# Patient Record
Sex: FEMALE | Race: WHITE | URBAN - METROPOLITAN AREA
[De-identification: names, ages, dates, MRNs, and addresses within clinical notes are randomized per-mention and may not be internally consistent; named-entity substitution may affect disease eponyms.]

---

## 2022-09-13 ENCOUNTER — HOSPITAL ENCOUNTER (INPATIENT)
Age: 87
LOS: 9 days | Discharge: HOME | DRG: 949 | End: 2022-09-22
Attending: PHYSICAL MEDICINE & REHABILITATION | Admitting: PHYSICAL MEDICINE & REHABILITATION
Payer: MEDICARE

## 2022-09-13 PROBLEM — K56.609 SBO (SMALL BOWEL OBSTRUCTION) (HCC): Status: ACTIVE | Noted: 2022-09-13

## 2022-09-13 PROCEDURE — 97116 GAIT TRAINING THERAPY: CPT

## 2022-09-13 PROCEDURE — 1280000000 HC REHAB R&B

## 2022-09-13 PROCEDURE — 6370000000 HC RX 637 (ALT 250 FOR IP): Performed by: PHYSICAL MEDICINE & REHABILITATION

## 2022-09-13 PROCEDURE — 97166 OT EVAL MOD COMPLEX 45 MIN: CPT

## 2022-09-13 PROCEDURE — 97162 PT EVAL MOD COMPLEX 30 MIN: CPT

## 2022-09-13 PROCEDURE — 97530 THERAPEUTIC ACTIVITIES: CPT

## 2022-09-13 PROCEDURE — 97110 THERAPEUTIC EXERCISES: CPT

## 2022-09-13 PROCEDURE — 97535 SELF CARE MNGMENT TRAINING: CPT

## 2022-09-13 PROCEDURE — 94760 N-INVAS EAR/PLS OXIMETRY 1: CPT

## 2022-09-13 PROCEDURE — 6360000002 HC RX W HCPCS: Performed by: PHYSICAL MEDICINE & REHABILITATION

## 2022-09-13 RX ORDER — MULTIVITAMIN WITH IRON
1 TABLET ORAL DAILY
Status: DISCONTINUED | OUTPATIENT
Start: 2022-09-14 | End: 2022-09-22 | Stop reason: HOSPADM

## 2022-09-13 RX ORDER — BISACODYL 10 MG
10 SUPPOSITORY, RECTAL RECTAL DAILY PRN
Status: DISCONTINUED | OUTPATIENT
Start: 2022-09-13 | End: 2022-09-22 | Stop reason: HOSPADM

## 2022-09-13 RX ORDER — HEPARIN SODIUM 5000 [USP'U]/ML
5000 INJECTION, SOLUTION INTRAVENOUS; SUBCUTANEOUS EVERY 8 HOURS SCHEDULED
Status: DISCONTINUED | OUTPATIENT
Start: 2022-09-13 | End: 2022-09-22 | Stop reason: HOSPADM

## 2022-09-13 RX ORDER — ACETAMINOPHEN 325 MG/1
975 TABLET ORAL EVERY 8 HOURS PRN
COMMUNITY

## 2022-09-13 RX ORDER — POLYETHYLENE GLYCOL 3350 17 G/17G
17 POWDER, FOR SOLUTION ORAL DAILY
COMMUNITY

## 2022-09-13 RX ORDER — LEVOTHYROXINE SODIUM 0.1 MG/1
100 TABLET ORAL DAILY
Status: DISCONTINUED | OUTPATIENT
Start: 2022-09-14 | End: 2022-09-22 | Stop reason: HOSPADM

## 2022-09-13 RX ORDER — TRAMADOL HYDROCHLORIDE 50 MG/1
100 TABLET ORAL EVERY 4 HOURS PRN
Status: DISCONTINUED | OUTPATIENT
Start: 2022-09-13 | End: 2022-09-22 | Stop reason: HOSPADM

## 2022-09-13 RX ORDER — TRAMADOL HYDROCHLORIDE 50 MG/1
25-50 TABLET ORAL EVERY 6 HOURS PRN
Status: ON HOLD | COMMUNITY
End: 2022-09-21 | Stop reason: HOSPADM

## 2022-09-13 RX ORDER — FLUTICASONE PROPIONATE 50 MCG
1 SPRAY, SUSPENSION (ML) NASAL DAILY
Status: DISCONTINUED | OUTPATIENT
Start: 2022-09-13 | End: 2022-09-22 | Stop reason: HOSPADM

## 2022-09-13 RX ORDER — BIOTIN 2500 MCG
CAPSULE ORAL
COMMUNITY

## 2022-09-13 RX ORDER — LEVOTHYROXINE SODIUM 0.1 MG/1
50 TABLET ORAL
COMMUNITY

## 2022-09-13 RX ORDER — SENNA AND DOCUSATE SODIUM 50; 8.6 MG/1; MG/1
2 TABLET, FILM COATED ORAL 2 TIMES DAILY
Status: DISCONTINUED | OUTPATIENT
Start: 2022-09-13 | End: 2022-09-20

## 2022-09-13 RX ORDER — ONDANSETRON 4 MG/1
4 TABLET, FILM COATED ORAL EVERY 8 HOURS PRN
Status: DISCONTINUED | OUTPATIENT
Start: 2022-09-13 | End: 2022-09-22 | Stop reason: HOSPADM

## 2022-09-13 RX ORDER — HEPARIN SODIUM 5000 [USP'U]/ML
5000 INJECTION, SOLUTION INTRAVENOUS; SUBCUTANEOUS 2 TIMES DAILY
Status: ON HOLD | COMMUNITY
End: 2022-09-21 | Stop reason: HOSPADM

## 2022-09-13 RX ORDER — LATANOPROST 50 UG/ML
1 SOLUTION/ DROPS OPHTHALMIC NIGHTLY
COMMUNITY

## 2022-09-13 RX ORDER — TAMSULOSIN HYDROCHLORIDE 0.4 MG/1
0.4 CAPSULE ORAL DAILY
COMMUNITY

## 2022-09-13 RX ORDER — LATANOPROST 50 UG/ML
1 SOLUTION/ DROPS OPHTHALMIC NIGHTLY
Status: DISCONTINUED | OUTPATIENT
Start: 2022-09-13 | End: 2022-09-15

## 2022-09-13 RX ORDER — FLUTICASONE PROPIONATE 50 MCG
1 SPRAY, SUSPENSION (ML) NASAL PRN
COMMUNITY

## 2022-09-13 RX ORDER — TRAMADOL HYDROCHLORIDE 50 MG/1
50 TABLET ORAL EVERY 4 HOURS PRN
Status: DISCONTINUED | OUTPATIENT
Start: 2022-09-13 | End: 2022-09-22 | Stop reason: HOSPADM

## 2022-09-13 RX ORDER — ACETAMINOPHEN 325 MG/1
650 TABLET ORAL EVERY 4 HOURS PRN
Status: DISCONTINUED | OUTPATIENT
Start: 2022-09-13 | End: 2022-09-22 | Stop reason: HOSPADM

## 2022-09-13 RX ORDER — POLYETHYLENE GLYCOL 3350 17 G/17G
17 POWDER, FOR SOLUTION ORAL DAILY
Status: DISCONTINUED | OUTPATIENT
Start: 2022-09-14 | End: 2022-09-20

## 2022-09-13 RX ORDER — TAMSULOSIN HYDROCHLORIDE 0.4 MG/1
0.4 CAPSULE ORAL DAILY
Status: DISCONTINUED | OUTPATIENT
Start: 2022-09-13 | End: 2022-09-22 | Stop reason: HOSPADM

## 2022-09-13 RX ORDER — VITAMIN E 268 MG
CAPSULE ORAL
COMMUNITY

## 2022-09-13 RX ADMIN — Medication 1 TABLET: at 21:35

## 2022-09-13 RX ADMIN — TAMSULOSIN HYDROCHLORIDE 0.4 MG: 0.4 CAPSULE ORAL at 21:34

## 2022-09-13 RX ADMIN — HEPARIN SODIUM 5000 UNITS: 5000 INJECTION INTRAVENOUS; SUBCUTANEOUS at 21:35

## 2022-09-13 RX ADMIN — LATANOPROST 1 DROP: 50 SOLUTION OPHTHALMIC at 21:35

## 2022-09-13 RX ADMIN — SENNOSIDES AND DOCUSATE SODIUM 2 TABLET: 50; 8.6 TABLET ORAL at 21:34

## 2022-09-13 NOTE — PLAN OF CARE
ARU PATIENT TREATMENT PLAN  [address]  [phone]    Pj Sanabria    : 1/10/1928  Acct #: [de-identified]  MRN: [id number]   PHYSICIAN:  Bindu Rojo MD  Primary Problem    Patient Active Problem List   Diagnosis    SBO (small bowel obstruction) [facility])       Rehabilitation Diagnosis:     SBO (small bowel obstruction) ([facility] [address] [K56.609]       ADMIT DATE:2022    Patient Goals: I want to get stronger and be able to take care of myself again    Admitting Impairments: Small Bowel Obstruction - tramadol     Hypertension-monitor     Hypothyroid- synthroid     Asthma -Flonase     glaucoma -Xalatan  Barriers: Patient past medical history positive for asthma, glaucoma, hypertension, hypothyroidism.   Pt lives alone in a 1 level condo  Participation: good     CARE PLAN     NURSING:  Pj Sanabria while on this unit will:     [] Be continent of bowel and bladder     [x] Have an adequate number of bowel movements  [x] Urinate with no urinary retention >300ml in bladder  [] Complete bladder protocol with miranda removal  [x] Maintain O2 SATs at __92_%  [x] Have pain managed while on ARU       [] Be pain free by discharge   [x] Have no skin breakdown while on ARU  [] Have improved skin integrity via wound measurements  [] Have no signs/symptoms of infection at the wound site  [x] Be free from injury during hospitalization   [] Complete education with patient/family with understanding demonstrated for:  [x] Adjustment   [x] Other:   Nursing interventions may include bowel/bladder training, education for medical assistive devices, medication education, O2 saturation management, energy conservation, stress management techniques, fall prevention, alarms protocol, seating and positioning, skin/wound care, pressure relief instruction,dressing changes,  infection protection, DVT prophylaxis, and/or assistance with in room safety with transfers to bed, toilet, wheelchair, shower as well as bathroom activities and hygiene. Patient/caregiver education for:   [x] Disease/sustained injury/management      [x] Medication Use   [] Surgical intervention   [x] Safety   [x] Body mechanics and or joint protection   [x] Health maintenance         PHYSICAL THERAPY:  Goals:                  Short Term Goals  Time Frame for Short term goals: 1 week  Short term goal 1: Ambulating community level distances with mod I with us of RW. Short term goal 2: Ambulating household distances with mod I and SPC. Short term goal 3: Complete 12 steps mod I with use of bilateral handrails. Short term goal 4: Sit<>supine with mod I. Short term goal 5: Ascend/Descend curb step with wheeled walker with modified independent               These goals were reviewed with this patient at the time of assessment and Zaria Blake is in agreement. Plan of Care: Pt to be seen 90   mins per day for 5 day/week 1 weeks. Current Treatment Recommendations: Strengthening, Balance training, Functional mobility training, Transfer training, Gait training, Stair training, Safety education & training      OCCUPATIONAL THERAPY:  Goals:             Short Term Goals  Time Frame for Short term goals: 1 week pt will. .  Short Term Goal 1: bathe indep with shower chair, grab bars  Short Term Goal 2: dress UB and LB indep except ISHAN hose if she needs to continue to wear  Short Term Goal 3: toilet indep with grab bars  Short Term Goal 4: transfer indep with LRAD  Short Term Goal 5: functional mobility  and simple meal prep indep with LRAD  Short Term Goal 6: improve activity tolerance to stand 8 min for ADL/IADL tasks :  Long Term Goals  Time Frame for Long term goals : same as stg : These goals were reviewed with this patient at the time of assessment and Zaria Blake is in agreement    Plan of Care:  Pt to be seen 90   mins per day for 5-6 day/week 1 week.            SPEECH THERAPY: Goals will be left blank if speech is not following this patient. Goals: These goals were reviewed with this patient at the time of assessment and Magda Chaney is in agreement    Plan of Care: Pt to be seen    mins per day for  day/week  weeks. CASE MANAGEMENT:  Goals:   Assist patient/family with discharge planning, patient/family counseling,   and coordination with insurance during ARU stay. Admission Period/Goal QIM CODES   QIM  Admit/Goal Score    Eating     Oral Hygiene     Toileting Hygiene     Shower/Bathe Self     Upper Body Dressing     Lower Body Dressing     Putting on/Taking off Footwear     Roll Left and Right     Sit to Lying     Lying to Sitting on Side of Bed     Sit to Stand     Chair/Bed to Chair Transfer     Toilet Transfer     Car Transfer     Walk 10 feet     Walk 50 feet with 2 Turns     Walk 150 feet with 2 turns     Walk 10 feet on Uneven Surfaces     1 Step     4 Steps     12 Steps     Picking up Object     Wheel 50 feet with 2 Turns   Type? Wheel 150 feet with 2 Turns   Type? Magda Chaney will be seen a minimum of 3 hours of therapy per day, a minimum of 5 out of 7 days per week. [] In this rare instance due to the nature of this patient's medical involvement, this patient will be seen 15 hours per week (900 minutes within a 7 day period). Treatments may include therapeutic exercises, gait training, neuromuscular re-ed, transfer training, community reintegration, bed mobility, w/c mobility and training, self care, home mgmt, cognitive training, energy conservation,dysphagia tx, speech/language/communication therapy, group therapy, and patient/family education. In addition, dietician/nutritionist may monitor calorie count as well as intake and collaboratively work with SLP on dietary upgrades. Neuropsychology/Psychology may evaluate and provide necessary support.     Medical issues being managed closely and that require 24 hour availability of a physician:   [] Swallowing Precautions  [x] Bowel/Bladder Fx  [] Weight bearing precautions   [] Wound Care    [x] Pain Mgmt   [x] Infection Protection   [x] DVT Prophylaxis   [x] Fall Precautions  [x] Fluid/Electrolyte/Nutrition Balance   [] Voice Protection   [] Respiratory  [] Other:    Medical Prognosis: [] Good  [x] Fair    [] Guarded   Total expected IRF days 11 days  Anticipated discharge destination:    [] Home Independently   [x] Home Modified Independent  [] Home with supervision    []SNF     [] Other                                           Physician anticipated functional outcomes:  Samuel for functional mobility and ADLs     IPOC brief synthesis: Patient is admitted from [facility] after treatment for small bowel obstruction. She is a 44-year-old female with a known history of hypertension, asthma, glaucoma, who presented on 9/5 to [facility] with high-grade mechanical small bowel obstruction. Patient was taken to surgery the same day for exploratory laparotomy and repair of abdominal hernia causing obstruction of her small bowel. Postop NG tube remains in place, this was removed as her diet was slowly advanced, and now she is tolerating a regular diet. Patient also placed back on Synthroid for her hypothyroidism. At the time of discharge, patient is tolerating oral food and hydration, was ambulating with assistance after she started therapies. Patient needs more therapy before discharge to home safely. Patient is agreeable to rehab unit treatment.         I have reviewed this initial plan of care and agree with its contents:    Title   Name    Date    Time    Physician: Dr. Isabela Caldera, 9/14/22, 11 AM    Case Mgmt:  [place], [place]     Case Management   [phone]    9/15/2022  4:10 PM      OT: CARRIE Turk/L  #[id number] 9/13/22 4:19 PM      PT:Electronically signed by Migel Short PT on 9/13/22 at 4:34 PM EDT     RN: Ayo Heredia RN, OLMANN    ST:      Other:

## 2022-09-13 NOTE — PROGRESS NOTES
Department of [facility] Progress Note  9/13/2022  9:58 AM    Patient Name:   Oneil Garza  YOB: 1928    Diagnosis: Small bowel obstruction, s/p surgery        Subjective: Patient is admitted from [address] [place] [address] after treatment for small bowel obstruction. She is a 80-year-old female with a known history of hypertension, asthma, glaucoma, who presented on 9/5 to [facility] with high-grade mechanical small bowel obstruction. Patient was taken to surgery the same day for exploratory laparotomy and repair of abdominal hernia causing obstruction of her small bowel. Postop NG tube remains in place, this was removed as her diet was slowly advanced, and now she is tolerating a regular diet. Patient also placed back on Synthroid for her hypothyroidism. The time of discharge, patient is tolerating oral food and hydration, was ambulating with assistance after she started therapies. Patient needs more therapy before discharge to home safely. Patient is agreeable to rehab unit treatment. There were no vitals taken for this visit. Last 24 hour lab  No results found for this or any previous visit (from the past 24 hour(s)). Plan: We will get patient admitted to our rehab unit today.       Dr. Ji Alberto

## 2022-09-13 NOTE — PROGRESS NOTES
Occupational Therapy  Facility/Department: [facility]  REHAB  Rehabilitation Occupational Therapy Evaluation       Date: 22  Patient Name: Natalie Fernandez       Room: K3L-5715/6971-44  MRN: [id number]  Account: [de-identified]   : 1/10/1928  (80 y.o.) Gender: female     Referring Practitioner: Jackson  Diagnosis: SBO  Additional Pertinent Hx: Patient is admitted from [address] [place] [address] after treatment for small bowel obstruction. She is a 51-year-old female with a known history of hypertension, asthma, glaucoma, who presented on  to [facility] with high-grade mechanical small bowel obstruction. Patient was taken to surgery the same day for exploratory laparotomy and repair of abdominal hernia causing obstruction of her small bowel. Postop NG tube remains in place, this was removed as her diet was slowly advanced, and now she is tolerating a regular diet. Patient also placed back on Synthroid for her hypothyroidism. The time of discharge, patient is tolerating oral food and hydration, was ambulating with assistance after she started therapies. Patient needs more therapy before discharge to home safely. Patient is agreeable to rehab unit treatment. (copied per note Dr Terry Sainz 22)    Restrictions  Restrictions/Precautions: Fall Risk  Other position/activity restrictions: abdominal incision  Equipment Used: Bed;Wheelchair    Subjective  Subjective: Pt met bedside, finishing with nursing admit, agreeable to OT      Social/Functional History  Lives With: Alone  Type of Home: Condo  Home Layout: One level  Home Access: Level entry (one step from garage, but can enter from front without a step)  Bathroom Shower/Tub: Tub/Shower unit; Walk-in shower (uses walk in with built in seat in corner)  Bathroom Equipment: Tub transfer bench;Built-in shower seat; Toilet raiser (uses built in seat, stands for most of shower, one commode is handicap with grab bars, other is std but has riser with arms to put on)  Home Equipment: Cane;Grab bars; Walker, rolling;Reacher;Sock aid;Long-handled shoehorn (walker tray)  Receives Help From: Neighbor (sometimes takes out the garbage for her)  ADL Assistance: Independent  Homemaking Assistance: Independent  Ambulation Assistance: Independent  Active : Yes  Mode of Transportation: Car  Occupation: Retired  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 Uses cane when going out of the house            Objective     Cognition  Overall Cognitive Status: [facility]  Orientation  Overall Orientation Status: Within Functional Limits   Perception  Overall Perceptual Status: WFL  Sensation  Overall Sensation Status: WFL   ROM  LUE AROM (degrees)  LUE AROM : Exceptions  LUE General AROM: 100 deg shoulder flex, otherwise WFL, trigger finger D 4  RUE AROM (degrees)  RUE AROM : Exceptions  RUE General AROM: 100 deg shoulder flex, otherwise WFL, trigger finger D 4  LUE Strength  Gross LUE Strength: Exceptions to [facility]  LUE Strength Comment: shoulder 3+/5, otherwise WFL  RUE Strength  Gross RUE Strength: WFL  RUE Strength Comment: shoulder 4/5, otherwise WFL  Fine Motor Skills  Coordination  Movements Are Fluid And Coordinated: Yes   Comment: minimally impaired by trigger finger bilat D 4        Functional Mobility  Balance  Sitting Balance: Supervision  Standing Balance: Contact guard assistance  Transfers  Sit to stand: Contact guard assistance  Stand to sit: Contact guard assistance  Toilet Transfers  Toilet - Technique: Ambulating  Equipment Used: Grab bars (comfort height commode)  Toilet Transfer: Contact guard assistance  Shower Transfers  Shower - Transfer From: Walker  Shower - Transfer Type: To and From  Shower - Transfer To:  Shower seat with back  Shower - Technique: Ambulating  Shower Transfers: Contact Guard  Wheelchair Bed Transfers  Wheelchair/Bed - Technique: Ambulating  Equipment Used: Bed;Wheelchair  Level of Asssistance: Contact guard assistance    ADL  Grooming: Setup;Stand by assistance  Grooming Skilled Clinical Factors: oral care standing at sink with SBA, hair seated in wheelchair  UE Bathing: Setup  LE Bathing: Moderate assistance  LE Bathing Skilled Clinical Factors: pt completed thighs, db area seated, stood with CGA to bathe buttocks, assisted to dry fully, assisted with lower legs  UE Dressing: Setup  UE Dressing Skilled Clinical Factors: camisole and dress over head  LE Dressing: Maximum assistance  LE Dressing Skilled Clinical Factors: assisted with briefs over feet, over hips with CGA, assisted with slipper socks, ISHAN hose and shoes. Pt unable to reach feet  Toileting: Moderate assistance  Toileting Skilled Clinical Factors: assist to manage briefs down, did not pull up due to shower, hygiene per self after small BM, incontinent of urine in brief    Goals  Patient Goals   Patient goals : I want to get stronger and be able to take care of myself again  Short Term Goals  Time Frame for Short term goals: 1 week pt will. .  Short Term Goal 1: bathe indep with shower chair, grab bars  Short Term Goal 2: dress UB and LB indep except ISHAN hose if she needs to continue to wear  Short Term Goal 3: toilet indep with grab bars  Short Term Goal 4: transfer indep with LRAD  Short Term Goal 5: functional mobility  and simple meal prep indep with LRAD  Short Term Goal 6: improve activity tolerance to stand 8 min for ADL/IADL tasks  Long Term Goals  Time Frame for Long term goals : same as stg    Assessment  Performance deficits / Impairments: Decreased functional mobility ; Decreased balance;Decreased ADL status; Decreased endurance;Decreased high-level IADLs  Assessment: Pt is a 79 yo female admitted from  after a SBO with surgery.   Pt lives alone, was indep with ADL, IADL including driving. She is currently functioning below baseline in above areas. She was able to bathe with mod assist, dress UB with set up, LB with max assist.  Transferred with RW, functional mobility with RW and CGA. Cognition is WFL, UE limited with shoulder flexion with decreased strength and AROM. Anticipate pt will require  approx 1 week inpt rehab with skilled OT services to maximize indep for safe return home with assist PRN and  home OT. Do not anticipate further DME  Treatment Diagnosis: decreased ADL/IADL, balance, activity tolerance  Prognosis: Good  Decision Making: Medium Complexity  History: Patient is admitted from [facility] after treatment for small bowel obstruction. She is a 22-year-old female with a known history of hypertension, asthma, glaucoma, who presented on 9/5 to [facility] with high-grade mechanical small bowel obstruction. Patient was taken to surgery the same day for exploratory laparotomy and repair of abdominal hernia causing obstruction of her small bowel. Postop NG tube remains in place, this was removed as her diet was slowly advanced, and now she is tolerating a regular diet. Patient also placed back on Synthroid for her hypothyroidism. The time of discharge, patient is tolerating oral food and hydration, was ambulating with assistance after she started therapies. Patient needs more therapy before discharge to home safely. Patient is agreeable to rehab unit treatment. (copied per note Dr Isabela Caldera 9/13/22)  Exam: bathing, dressing, toileting, transfers, mobility, UE, cognition,  Assistance / Modification: mod assist LB bathing, UB set up , LB with max assist, transfer CGA RW  Discharge Recommendations: Home with Home health OT; Home with assist PRN          Therapy Time   Individual Concurrent Group Co-treatment   Time In 1300         Time Out 1430         Minutes 90         Timed Code Treatment Minutes: 75 Minutes (+15 min eval)       Corie Nguyen, OTR/L 21

## 2022-09-13 NOTE — PROGRESS NOTES
Physical Therapy  Facility/Department: [facility]  Physical Therapy Initial Assessment    Name: Madi Esqueda  : 1/10/1928  MRN: [id number]  Date of Service: 2022    Discharge Recommendations:  Home with assist PRN, Home with Home health PT   PT Equipment Recommendations  Equipment Needed: No      Patient Diagnosis(es): There were no encounter diagnoses. Past Medical History:  has no past medical history on file. Past Surgical History:  has no past surgical history on file. Assessment   Body Structures, Functions, Activity Limitations Requiring Skilled Therapeutic Intervention: Decreased endurance;Decreased strength;Decreased functional mobility   Assessment: She is a 28-year-old female with a known history of hypertension, asthma, glaucoma, who presented on  to [facility] with high-grade mechanical small bowel obstruction. Patient was taken to surgery the same day for exploratory laparotomy and repair of abdominal hernia causing obstruction of her small bowel. Patient also placed back on Synthroid for her hypothyroidism. Pt is mod I for bed mobility but supervision for sit<>supine. Tranfers were completed with supervision except car transfers which was SBA with increased verbal cues required in order to safely complete. Stairs were completed with SBA with reciprocal pattern. Pt became fatigued and emotional following treatment session. Pt has decreased strength and endurance overall and is below her baseline functioning level. She will benefit from skilled intervention in order to address her decreased strength, endurance and functional mobility in order to return safely to home. Therapy Prognosis: Good  Decision Making: Medium Complexity  History: See below  Barriers to Learning: none  Activity Tolerance  Activity Tolerance: Patient limited by endurance; Patient tolerated treatment well     Plan   Plan  Plan:  minutes of therapy at least 5 out of 7 days a week  Current Treatment Recommendations: Strengthening, Balance training, Functional mobility training, Transfer training, Gait training, Stair training, Safety education & training  Safety Devices  Type of Devices: All fall risk precautions in place, Gait belt, Left in chair (in wheelchair to return to room with transportation)     Restrictions  Restrictions/Precautions  Restrictions/Precautions: Fall Risk  Position Activity Restriction  Other position/activity restrictions: abdominal incision     Subjective   General  Chart Reviewed: Yes  Additional Pertinent Hx: Per Dr. Eva Herring H&P, \"Patient is admitted from [facility] after treatment for small bowel obstruction. She is a 68-year-old female with a known history of hypertension, asthma, glaucoma, who presented on 9/5 to [facility] with high-grade mechanical small bowel obstruction. Patient was taken to surgery the same day for exploratory laparotomy and repair of abdominal hernia causing obstruction of her small bowel. Postop NG tube remains in place, this was removed as her diet was slowly advanced, and now she is tolerating a regular diet. Patient also placed back on Synthroid for her hypothyroidism. The time of discharge, patient is tolerating oral food and hydration, was ambulating with assistance after she started therapies. Patient needs more therapy before discharge to home safely. \"  Response To Previous Treatment: Not applicable  Family / Caregiver Present: No  Referring Practitioner: Dr. Singh Gudino  Referral Date : 09/13/22  Diagnosis: SBO  Follows Commands: Within Functional Limits  Subjective  Subjective: Pt reports no pain at this time.          Social/Functional History  Social/Functional History  Lives With: Alone  Type of Home: Condo  Home Layout: One level  Home Access: Level entry (one step from garage, but can enter from front without a step)  Bathroom Shower/Tub: Tub/Shower unit, Walk-in shower (uses walk in with built in seat in corner)  Bathroom Equipment: Tub transfer bench, Built-in shower seat, Toilet raiser (uses built in seat, stands for most of shower, one commode is handicap with grab bars, other is std but has riser with arms to put on)  Home Equipment: Cane, Grab bars, Walker, rolling, Reacher, Sock aid, Long-handled shoehorn (walker tray)  Has the patient had two or more falls in the past year or any fall with injury in the past year?: Yes (pt reports there are several causes of falls in the past year but there is no pattern)  Receives Help From: Neighbor (sometimes takes out the garbage for her)  ADL Assistance: Independent  Homemaking Assistance: Independent  Ambulation Assistance: Independent (Nini Alvares with tray to help carry items within the house, Travis Bah when going to ambulate in the community.)  Transfer Assistance: Independent  Active : Yes  Mode of Transportation: Car  Occupation: Retired  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 Uses cane when going out of the house. Pt has neice and newphew that live near by.   Vision/Hearing  Vision  Vision: Impaired  Vision Exceptions: Wears glasses at all times  Hearing  Hearing: Exceptions to [facility]  Hearing Exceptions: Hard of hearing/hearing concerns    Cognition   Orientation  Overall Orientation Status: Within Functional Limits  Orientation Level: Oriented X4 (BIMs 15/15)  Cognition  Overall Cognitive Status: WFL     Objective        Observation/Palpation  Posture: Fair (Pt has some rounding of her shoulders)  Observation: unremarkable        AROM RLE (degrees)  RLE AROM: WFL  AROM LLE (degrees)  LLE AROM : WFL  AROM RUE (degrees)  RUE AROM : WFL  AROM LUE (degrees)  LUE AROM : WFL  Strength RLE  Strength RLE: Exception  R Hip Flexion: 4-/5  R Knee Flexion: 4-/5  R Knee Extension: 4-/5  R Ankle Dorsiflexion: 3/5  R Ankle Plantar flexion: 3+/5  Strength LLE  Strength LLE: Exception  L Hip Flexion: 3+/5  L Knee Flexion: 3+/5  L Knee Extension: 4-/5  L Ankle Dorsiflexion: 3+/5;3/5  L Ankle Plantar Flexion: 3+/5     Sensation  Overall Sensation Status: WFL     Bed mobility  Bridging: Modified independent   Rolling to Left: Modified independent  Rolling to Right: Modified independent  Supine to Sit: Supervision  Sit to Supine: Supervision  Scooting: Modified independent  Bed Mobility Comments: Bed mobility done on regular bed in ADL apartment. Pt required increased time in order to complete bed mobility. Pt required verbal cues to complete sit<>supine and was educated on a better way to perform supine>sit transfer to limit her pain in her incision site. Transfers  Sit to Stand: Supervision  Stand to sit: Supervision  Bed to Chair: Supervision  Car Transfer: Stand by assistance (Pt required verbal cues in order to know to push up from stable surface instead of use walker to rise from car.)  Comment: Pt able to complete sit<>stand transfers with supervision. Ambulation  Surface: level tile  Device: Rolling Walker  Assistance: Stand by assistance  Quality of Gait: Slow mya, internal rotation of LLE during gait  Gait Deviations: Slow Mya  Distance: 200' with 2 turns, [de-identified]' with 4 turns on uneven surface (carpet)  Comments: pt able to complete ambulation with SBA however has a decreased gait speed and IR of the RLE. Pt picked up item off of floor with a reacher (pt already has reacher at home) during second trial of ambulation with SBA. Stairs/Curb  Stairs?: Yes  Stairs  # Steps : 8  Stairs Height: 6\"  Rails: Bilateral  Curbs: 6\"  Device: Rolling walker  Assistance: Stand by assistance;Contact guard assistance  Comment: Pt was SBA for stairs with use of bilateral hand rails and completing a reciprocal pattern.  When completing curb step with no handrails patient required CGA in order to complete and verbal cues to complete. Pt used RW during curb step. Exercise Treatment: Pt completed the following exercises seated in WC: AP X 15 B, B marches X 15, B LAQ X 15. Goals  Short Term Goals  Time Frame for Short term goals: 1 week  Short term goal 1: Ambulating community level distances with mod I with us of RW. Short term goal 2: Ambulating household distances with mod I and SPC. Short term goal 3: Complete 12 steps mod I with use of bilateral handrails. Short term goal 4: Sit<>supine with mod I. Short term goal 5: Ascend/Descend curb step with wheeled walker with modified independent  Patient Goals   Patient goals : She wants to be able to do what she wants to do and return home independently. Education        Therapy Time   Individual Concurrent Group Co-treatment   Time In 1430         Time Out 1600         Minutes Coty Miller, 09/13/2022  Therapist was present, directed the patient's care, made skilled judgement, and was responsible for assessment and treatment of the patient.         Flip Tracy, [id number]

## 2022-09-13 NOTE — PROGRESS NOTES
A complete drug regimen review was completed for this patient.      [x]  No clinically significant medication issue was identified    []   Yes, a clinically significant medication issue was identified     []  Adverse Drug Event:      []  Allergy:      []  Side Effect:      []  Ineffective Therapy:      []  Drug Interaction:     []  Duplicated Therapy:     []  Untreated Indication:      []  Non-adherence:     []  Other:          Electronically signed by Meghan Neff RN,CRRN on 9/13/22 at 2:28 PM EDT

## 2022-09-13 NOTE — PROGRESS NOTES
4 Eyes Skin Assessment     NAME:  Kel Arce  YOB: 1928  MEDICAL RECORD NUMBER:  [id number]    The patient is being assess for  Admission    I agree that 2 RN's have performed a thorough Head to Toe Skin Assessment on the patient. ALL assessment sites listed below have been assessed. Areas assessed by both nurses:    Head, Face, Ears, Shoulders, Back, Chest, Arms, Elbows, Hands, Sacrum. Buttock, Coccyx, Ischium, and Legs. Feet and Heels; buttocks reddened and blanchable. Heels soft and reddened- heel protectors applied        Does the Patient have a Wound?  Incision mid abdomen       Elias Prevention initiated:  Yes   Wound Care Orders initiated:  NA    Pressure Injury (Stage 3,4, Unstageable, DTI, NWPT, and Complex wounds) if present place referral/consult order under [de-identified] NA    New and Established Ostomies if present place consult order under : NA      Nurse 1 eSignature: Electronically signed by Tanya Canas RN on 9/13/22 at 2:30 PM EDT    **SHARE this note so that the co-signing nurse is able to place an eSignature**    Nurse 2 eSignature: Electronically signed by Thais Hollins RN on 9/13/22 at 4:24 PM EDT

## 2022-09-13 NOTE — PROGRESS NOTES
Patient admitted to room 3260 per stretcher. Transferred to bed via stretcher. Patient was oriented to the Call Light, Phone, TV, Thermostat, Bed Controls, Bathroom and Emergency Cord. Patient verbalized and demonstrated understanding of all. Patient was also given an over view of Unit Routines for Acute Rehab, including what to wear for therapy. The patient's role in goal setting was reviewed along with an explanation of the Interdisciplinary Team meeting, the 's role in coordinating services and the Discharge Planning/Continuum of Care process. Patient Rights and Responsibilities were reviewed. Meal times were explained, including how to order food. The white board (used for communication) was pointed out emphasizing  the 3 hours/day Therapy Schedule (posted most evenings), the number (and process) for reporting grievances, and the Doctor's, Nurse's, and PCA's names. It was recommended that any family that will be care givers or any care givers the patient has, take part in therapy. There are no set visiting hours, and it was suggested that non-caregiver friends and family visitors come after therapy (at 4 PM or later) to allow patient to rest in between sessions.   Electronically signed by Smiley Bean RN, [address] [place] [address] on 9/13/22 at 2:29 PM EDT

## 2022-09-14 LAB
ANION GAP SERPL CALCULATED.3IONS-SCNC: 9 MMOL/L (ref 3–16)
BUN BLDV-MCNC: 23 MG/DL (ref 7–20)
CALCIUM SERPL-MCNC: 9.5 MG/DL (ref 8.3–10.6)
CHLORIDE BLD-SCNC: 100 MMOL/L (ref 99–110)
CO2: 26 MMOL/L (ref 21–32)
CREAT SERPL-MCNC: 0.7 MG/DL (ref 0.6–1.2)
GFR AFRICAN AMERICAN: >60
GFR NON-AFRICAN AMERICAN: >60
GLUCOSE BLD-MCNC: 108 MG/DL (ref 70–99)
HCT VFR BLD CALC: 39.7 % (ref 36–48)
HEMOGLOBIN: 13.5 G/DL (ref 12–16)
MAGNESIUM: 2.1 MG/DL (ref 1.8–2.4)
MCH RBC QN AUTO: 31.5 PG (ref 26–34)
MCHC RBC AUTO-ENTMCNC: 33.9 G/DL (ref 31–36)
MCV RBC AUTO: 92.9 FL (ref 80–100)
PDW BLD-RTO: 13.9 % (ref 12.4–15.4)
PLATELET # BLD: 275 K/UL (ref 135–450)
PLATELET SLIDE REVIEW: ADEQUATE
PMV BLD AUTO: 9.2 FL (ref 5–10.5)
POTASSIUM REFLEX MAGNESIUM: 4.6 MMOL/L (ref 3.5–5.1)
RBC # BLD: 4.28 M/UL (ref 4–5.2)
SLIDE REVIEW: NORMAL
SODIUM BLD-SCNC: 135 MMOL/L (ref 136–145)
WBC # BLD: 5.6 K/UL (ref 4–11)

## 2022-09-14 PROCEDURE — 97530 THERAPEUTIC ACTIVITIES: CPT

## 2022-09-14 PROCEDURE — 85027 COMPLETE CBC AUTOMATED: CPT

## 2022-09-14 PROCEDURE — 97116 GAIT TRAINING THERAPY: CPT

## 2022-09-14 PROCEDURE — 1280000000 HC REHAB R&B

## 2022-09-14 PROCEDURE — 36415 COLL VENOUS BLD VENIPUNCTURE: CPT

## 2022-09-14 PROCEDURE — 97535 SELF CARE MNGMENT TRAINING: CPT

## 2022-09-14 PROCEDURE — 97110 THERAPEUTIC EXERCISES: CPT

## 2022-09-14 PROCEDURE — 94760 N-INVAS EAR/PLS OXIMETRY 1: CPT

## 2022-09-14 PROCEDURE — 6370000000 HC RX 637 (ALT 250 FOR IP): Performed by: PHYSICAL MEDICINE & REHABILITATION

## 2022-09-14 PROCEDURE — 6360000002 HC RX W HCPCS: Performed by: PHYSICAL MEDICINE & REHABILITATION

## 2022-09-14 PROCEDURE — 83735 ASSAY OF MAGNESIUM: CPT

## 2022-09-14 PROCEDURE — 80048 BASIC METABOLIC PNL TOTAL CA: CPT

## 2022-09-14 RX ORDER — SENNA PLUS 8.6 MG/1
1 TABLET ORAL 2 TIMES DAILY
COMMUNITY

## 2022-09-14 RX ADMIN — LATANOPROST 1 DROP: 50 SOLUTION OPHTHALMIC at 22:06

## 2022-09-14 RX ADMIN — POLYETHYLENE GLYCOL 3350 17 G: 17 POWDER, FOR SOLUTION ORAL at 07:48

## 2022-09-14 RX ADMIN — HEPARIN SODIUM 5000 UNITS: 5000 INJECTION INTRAVENOUS; SUBCUTANEOUS at 05:21

## 2022-09-14 RX ADMIN — TAMSULOSIN HYDROCHLORIDE 0.4 MG: 0.4 CAPSULE ORAL at 22:00

## 2022-09-14 RX ADMIN — FLUTICASONE PROPIONATE 1 SPRAY: 50 SPRAY, METERED NASAL at 07:48

## 2022-09-14 RX ADMIN — SENNOSIDES AND DOCUSATE SODIUM 2 TABLET: 50; 8.6 TABLET ORAL at 07:48

## 2022-09-14 RX ADMIN — HEPARIN SODIUM 5000 UNITS: 5000 INJECTION INTRAVENOUS; SUBCUTANEOUS at 22:00

## 2022-09-14 RX ADMIN — HEPARIN SODIUM 5000 UNITS: 5000 INJECTION INTRAVENOUS; SUBCUTANEOUS at 14:43

## 2022-09-14 RX ADMIN — Medication 1 TABLET: at 22:00

## 2022-09-14 RX ADMIN — LEVOTHYROXINE SODIUM 100 MCG: 0.1 TABLET ORAL at 05:22

## 2022-09-14 RX ADMIN — Medication 1 TABLET: at 07:48

## 2022-09-14 RX ADMIN — THERA TABS 1 TABLET: TAB at 07:48

## 2022-09-14 NOTE — PROGRESS NOTES
Occupational Therapy  Facility/Department: [facility] RE[facility]  Rehabilitation Occupational Therapy Daily Treatment Note    Date: 22  Patient Name: Amando Morrison       Room: Q1G-6846/0605-13  MRN: [id number]  Account: [de-identified]   : 1/10/1928  (80 y.o.) Gender: female                    Past Medical History:  has no past medical history on file. Past Surgical History:   has no past surgical history on file. Restrictions  Restrictions/Precautions: Fall Risk  Other position/activity restrictions: abdominal incision    Subjective  Subjective: pt met in dept, agreeable to OT  Restrictions/Precautions: Fall Risk             Objective               ADL  Putting On/Taking Off Footwear  Skilled Clinical Factors: able to doff shoes, untie with difficulty, lean forward and don with some complaint of abdominal pain. Recommended elastic shoe laces which she was agreeable to after encouragement to decrease abdominal pain. She was then able to don shoes with elastic shoe laces and long shoe horn with cues  Toileting  Assistance Level: Minimal assistance  Skilled Clinical Factors: min assist for pants down/up, hygiene per self after BM, urination. Extended time for BM, keeps wiping as she does not completely empty bowels. Small BM, appears soft. Used stool to assist with BM  Toilet Transfers  Equipment: Grab bars  Additional Factors: With handrails  Assistance Level: Contact guard assist          Functional Mobility  Device: Rolling walker  Activity: To/From bathroom  Assistance Level: Contact guard assist;Stand by assist  Skilled Clinical Factors: no LOB< but amb slowly and cautiously  Transfers  Surface: Wheelchair  Additional Factors:  With handrails  Device: Walker  Sit to Stand  Assistance Level: Contact guard assist;Stand by assist  Stand to Sit  Assistance Level: Stand by assist;Contact guard assist   OT Exercises  Resistive Exercises: attempted 1 lb wt shoulder, but uncomfortable, so completed AROM shoulder 10 reps DENISE.  elbow flex/ext, sup/pron, wrist flex/ext with 1 lb dumbbell to improve activity tolerance for ADL, mobility       PM session  Pt met bedside, agreeable to OT  Pt in bathroom with nursing, seated on commode after BM. Completed hygiene per self, min assist to manage pants over hips, briefs per self (pt not used to wearing pants )  Stood to walker, amb to sink with SBA, stood to wash hands with SBA, oral care standing - tolerated standing 5 minutes at sink. Transpsorted to dept via wheelchair. Pt stated she likes her elastic laces, would like to get some for her other shoes. IADL - pt amb to kitchen using rolling walker, but then places walker to side and amb without device, using countertop for balance as needed. She was able to retrieve items from refrigerator, lower cabinet to prepare egg on stovetop, SBA /CGA for balance due to not using device. She was able to turn on /off stove appropriately, cracked egg into pan, prepared egg, placed to plate and carried to table without device, SBA/CGA, CGA/SBA to sit to chair without arms. After short rest break, returned plate to counter, cleaned up. Tolerated standing for 6 minutes at a time. Good safety awareness, anticipate pt will be safe to prepare meals by discharge pending progress. Returned to wheelchair, provided pt with long sponge for use tomorrow with ADL. Pt to room per transport at end of session  Assessment  Assessment  Assessment: Pt requried CGA [facility], commode with toilet safety frames, mobiltiy with rolling walker. Placed elastic shoe laces in shoes to modify shoes, decrease abdominal pain during LB ADL. Loaned pt reacher, long shoe horn as she has this equpment at home. Cont to have difficulty with bowels, strains to have BM  Activity Tolerance: Patient tolerated treatment well  Discharge Recommendations: Home with Home health OT; Home with assist PRN  OT Equipment Recommendations  Other: most likely has all needed equipment  Safety Devices  Safety Devices in place: Yes  Type of devices: Gait belt (to room per transport at end of session)    Patient Education  Education  Education Given To: Patient  Education Provided: ADL Function;Mobility Training;Transfer Training  Education Method: Demonstration;Verbal;Teach Back  Education Outcome: Verbalized understanding;Demonstrated understanding;Continued education needed    Plan  Plan  Times per Week: 5-6  Times per Day: Twice a day  Plan Weeks: 1 week  Current Treatment Recommendations: Strengthening;Balance training;Functional mobility training; Endurance training; Safety education & training;Patient/Caregiver education & training;Equipment evaluation, education, & procurement;Self-Care / ADL    Goals  Patient Goals   Patient goals : I want to get stronger and be able to take care of myself again  Short Term Goals  Time Frame for Short term goals: 1 week pt will. .  Short Term Goal 1: bathe indep with shower chair, grab bars  Short Term Goal 2: dress UB and LB indep except ISHAN hose if she needs to continue to wear  Short Term Goal 3: toilet indep with grab bars  Short Term Goal 4: transfer indep with LRAD  Short Term Goal 5: functional mobility  and simple meal prep indep with LRAD  Short Term Goal 6: improve activity tolerance to stand 8 min for ADL/IADL tasks  Long Term Goals  Time Frame for Long term goals : same as stg                   Therapy Time   Individual Concurrent Group Co-treatment   Time In 0935         Time Out 1030         Minutes 55         Timed Code Treatment Minutes: 55 Minutes     Therapy Time     Individual Co-treatment   Time In 1300     Time Out 1345     Minutes 305 [facility], OTR/L 770

## 2022-09-14 NOTE — PROGRESS NOTES
PHYSICAL THERAPY  Progress Note   Second Session    Patient Name: Marie Vann  Medical Record Number: [id number]           Chart Reviewed: Yes   Restrictions/Precautions: Fall Risk Other position/activity restrictions: abdominal incision   Additional Pertinent Hx: Per Dr. Deidra Elliott H&P, \"Patient is admitted from [address] [place] [address] after treatment for small bowel obstruction. She is a 77-year-old female with a known history of hypertension, asthma, glaucoma, who presented on 9/5 to [facility] with high-grade mechanical small bowel obstruction. Patient was taken to surgery the same day for exploratory laparotomy and repair of abdominal hernia causing obstruction of her small bowel. Postop NG tube remains in place, this was removed as her diet was slowly advanced, and now she is tolerating a regular diet. Patient also placed back on Synthroid for her hypothyroidism. The time of discharge, patient is tolerating oral food and hydration, was ambulating with assistance after she started therapies. Patient needs more therapy before discharge to home safely. \"        Subjective: Pt is in good spirits and reports she is very happy she was finally able to have a BM. She has no complaints of pain at this time. Objective  Sit<>stand with CGA. Patient ambulated 145' with single point cane with tripod base with CGA-SBA. Patient had one small LOB as she was attempting to ambulate through tighter space. Patient was able to follow instruction for direction but did have short delay with response. Sit<>stand with CGA  Patient performed 10 reps alternating toe taps on 4\"  box with UE support on cane in right hand. Patient was CGA majority of the time but was min assist at times. Patient ambulated to RUST with cane with SBA. [facility] and patient completed 8 minutes with resistance at 2, seat at 8, bilateral UE at 10, and average SPM at 31.  Patient slightly fatigued and required short seated rest

## 2022-09-14 NOTE — H&P
Department of [facility]  Dr. Dennise Guillen History & Physical      Patient Identification:  Neil Enamorado  : 1/10/1928  Admit date: 2022  Dictation date: 22   Attending provider: Devon Bustillos MD        Primary care provider: Mago Mckeon MD       Chief Complaint:   Patient Active Problem List   Diagnosis    SBO (small bowel obstruction) ([address]       History of Present Illness/Hospital Course:  Patient is admitted from [address] [place] [address] after treatment for small bowel obstruction. She is a 43-year-old female with a known history of hypertension, asthma, glaucoma, who presented on  to [facility] with high-grade mechanical small bowel obstruction. Patient was taken to surgery the same day for exploratory laparotomy and repair of abdominal hernia causing obstruction of her small bowel. Postop NG tube remains in place, this was removed as her diet was slowly advanced, and now she is tolerating a regular diet. Patient also placed back on Synthroid for her hypothyroidism. At the time of discharge, patient is tolerating oral food and hydration, was ambulating with assistance after she started therapies. Patient needs more therapy before discharge to home safely. Patient is agreeable to rehab unit treatment. Prior Level of Function:  Independent in self care and ADLs prior to admission. Current Level of Function:  PT:  Patient needs CGA to min assist for transfers and gait. OT:   Patient needs CGA to min assist for ADLs and self care activites      Patient past medical history positive for asthma, glaucoma, hypertension, hypothyroidism. reports that she has never smoked. She has never used smokeless tobacco. She reports that she does not drink alcohol and does not use drugs. family history is not on file. Prior to Admission medications    Medication Sig Start Date End Date Taking?  Authorizing Provider   heparin, porcine, 5000 UNIT/ML injection Inject 5,000 Units into the skin 2 times daily   Yes Historical Provider, MD   polyethylene glycol (GLYCOLAX) 17 GM/SCOOP powder Take 17 g by mouth daily   Yes Historical Provider, MD   tamsulosin (FLOMAX) 0.4 MG capsule Take 0.4 mg by mouth daily At bedtime for 5 days   Yes Historical Provider, MD   traMADol (ULTRAM) 50 MG tablet Take 50 mg by mouth every 6 hours as needed for Pain. Take 0.5-1 tablets by mouth every 6 hours as needed for up to 3 days   Yes Historical Provider, MD   acetaminophen (TYLENOL) 325 MG tablet Take 975 mg by mouth every 8 hours as needed for Pain   Yes Historical Provider, MD   latanoprost (XALATAN) 0.005 % ophthalmic solution Place 1 drop into the left eye nightly   Yes Historical Provider, MD   Biotin 2500 MCG CAPS Take by mouth   Yes Historical Provider, MD   Calcium Carbonate-Vit D-Min (CALTRATE 600+D PLUS MINERALS PO) Take by mouth   Yes Historical Provider, MD   fluticasone (FLONASE) 50 MCG/ACT nasal spray 1 spray by Each Nostril route as needed for Rhinitis   Yes Historical Provider, MD   levothyroxine (SYNTHROID) 100 MCG tablet Take 100 mcg by mouth Daily Take 1/2 tablet by mouth every morning before breakfast   Yes Historical Provider, MD   Multiple Vitamins-Minerals (THERATRUM COMPLETE) TABS Take by mouth   Yes Historical Provider, MD         REVIEW OF SYSTEMS:   CONSTITUTIONAL: negative for fevers, chills, diaphoresis, activity change, appetite change, fatigue, night sweats and unexpected weight change. EYES: negative for blurred vision, eye discharge, visual disturbance and icterus. + glaucoma  HEENT: negative for hearing loss, tinnitus, ear drainage, sinus pressure, nasal congestion, epistaxis and snoring. RESPIRATORY: Negative for hemoptysis, cough, sputum production.  + Asthma  CARDIOVASCULAR: negative for chest pain, palpitations, exertional chest pressure/discomfort, edema, syncope. + HTN  GASTROINTESTINAL: negative for nausea, vomiting, diarrhea, constipation, blood in stool and abdominal pain. GENITOURINARY: negative for frequency, dysuria, urinary incontinence, decreased urine volume, and hematuria. HEMATOLOGIC/LYMPHATIC: negative for easy bruising, bleeding and lymphadenopathy. ALLERGIC/IMMUNOLOGIC: negative for recurrent infections, angioedema, anaphylaxis and drug reactions. ENDOCRINE: negative for weight changes and diabetic symptoms including polyuria, polydipsia and polyphagia.+ Hypothyroid  MUSCULOSKELETAL: negative for pain, joint swelling, decreased range of motion and muscle weakness. NEUROLOGICAL: negative for headaches, slurred speech, unilateral weakness. PSYCHIATRIC/BEHAVIORAL: negative for hallucinations, behavioral problems, confusion and agitation. All pertinent positives are noted in the HPI. Physical Examination:  Vitals: Patient Vitals for the past 24 hrs:   BP Temp Temp src Pulse Resp SpO2 Height Weight   09/14/22 1037 -- -- -- -- -- 94 % -- --   09/14/22 0730 (!) 122/54 97.4 °F (36.3 °C) Oral 87 17 92 % -- --   09/14/22 0448 111/63 97.8 °F (36.6 °C) Oral 79 18 96 % -- 117 lb 15.1 oz (53.5 kg)   09/13/22 2133 134/64 98.1 °F (36.7 °C) Oral 71 16 93 % -- --   09/13/22 1301 -- -- -- -- -- -- 5' 3\" (1.6 m) 115 lb 4.8 oz (52.3 kg)   09/13/22 1249 121/70 98 °F (36.7 °C) Oral 80 18 -- -- --       Psych: Stable mood, normal judgement, normal affect   Const: No distress  Eyes: Conjunctiva noninjected, no icterus noted; pupils equal, round, and reactive to light. HENT: Atraumatic, normocephalic; Oral mucosa moist  Neck: Trachea midline, neck supple. No thyromegaly noted. CV: Regular rate and rhythm, no murmur rub or gallop noted  Resp: Lungs clear to auscultation bilaterally, no rales wheezes or ronchi, no retractions. Respirations unlabored. GI: Soft, nontender, nondistended. Normal bowel sounds. No palpable masses. Healing midline abdominal incision noted. Neuro: Alert, oriented, appropriate. No cranial nerve deficits appreciated.  Motor examination reveals normal strength in uppers, and 4/5 strength in lower extremities diffusely. Skin: Normal temperature and turgor  MSK: No joint abnormalities noted. Ext: No significant edema appreciated. No varicosities. Lab Results   Component Value Date    WBC 5.6 09/14/2022    HGB 13.5 09/14/2022    HCT 39.7 09/14/2022    MCV 92.9 09/14/2022     No results found for: INR, PROTIME  Lab Results   Component Value Date    CREATININE 0.7 09/14/2022    BUN 23 (H) 09/14/2022     (L) 09/14/2022    K 4.6 09/14/2022     09/14/2022    CO2 26 09/14/2022     No results found for: ALT, AST, GGT, ALKPHOS, BILITOT    No results found. The above labs and diagnostic studies have been reviewed by myself upon admission to inpatient rehabilitation. Barriers to Discharge: Patient past medical history positive for asthma, glaucoma, hypertension, hypothyroidism. Pt lives alone in a 1 level condo. POST ADMISSION PHYSICIAN EVALUATION  The patient has agreed to being admitted to our comprehensive inpatient  rehabilitation facility consisting of at least 180 minutes of therapy a day,  5 out of 7 days a week. The patient/family has a good understanding of our discharge process. The  patient has potential to make improvement and is in need of at least two of  the following multidisciplinary therapies including but not limited to  physical, occupational, respiratory, and speech, nutritional services, wound care,   and prosthetics and orthotics. Given the patients complex condition  and risk of further medical complications, rehabilitation services cannot be  safely provided at a lower level of care such as a skilled nursing facility. I have compared the patients medical and functional status at the time of the  preadmission screening and the same on this date, and there are no significant changes.     By signing this document, I acknowledge that I have personally performed a  full physical examination on this patient within 24 hours of admission to  this inpatient rehabilitation facility and have determined the patient to be  able to tolerate the above course of treatment at an intensive level for a  reasonable period of time. I will be completing a detailed individualized  Plan of Care for this patient by day four of the patients stay based upon the  Preadmission Screen, this Post-Admission Evaluation, and the therapy  evaluations. Assessment and Plan:    Small Bowel Obstruction - tramadol    Hypertension-monitor    Hypothyroid- synthroid    Asthma -Flonase    glaucoma -Xalatan    Bowels: Schedule Miralax + Senna S. Follow bowel movements. Enema or suppository if needed. Bladder: Check PVR x 3. [facility] if PVR > 200ml or if any volume is > 500 ml. Pain:  Tramadol is ordered prn.        Arthur Pinzon MD, 9/14/2022, 10:58 AM

## 2022-09-14 NOTE — PLAN OF CARE
Problem: Discharge Planning  Goal: Discharge to home or other facility with appropriate resources  9/14/2022 0046 by Clyde Colon RN  Outcome: Progressing     Problem: Skin/Tissue Integrity  Goal: Absence of new skin breakdown  Description: 1. Monitor for areas of redness and/or skin breakdown  2. Assess vascular access sites hourly  3. Every 4-6 hours minimum:  Change oxygen saturation probe site  4. Every 4-6 hours:  If on nasal continuous positive airway pressure, respiratory therapy assess nares and determine need for appliance change or resting period.   9/14/2022 0046 by Clyde Colon RN  Outcome: Progressing     Problem: Safety - Adult  Goal: Free from fall injury  9/14/2022 0046 by Clyde Colon RN  Outcome: Progressing     Problem: ABCDS Injury Assessment  Goal: Absence of physical injury  9/14/2022 0046 by Clyde Colon RN  Outcome: Progressing

## 2022-09-14 NOTE — PROGRESS NOTES
Physical Therapy  Facility/Department: [facility]  Rehabilitation Physical Therapy Treatment Note    NAME: Kylie Bright  : 1/10/1928 (80 y.o.)  MRN: [id number]  CODE STATUS: Full Code    Date of Service: 22       Restrictions:  Restrictions/Precautions: Fall Risk  Position Activity Restriction  Other position/activity restrictions: abdominal incision     Pertinent medical information:  Additional Pertinent Hx: Per Dr. Aviva Topete H&P, \"Patient is admitted from [facility] after treatment for small bowel obstruction. She is a 77-year-old female with a known history of hypertension, asthma, glaucoma, who presented on  to [facility] with high-grade mechanical small bowel obstruction. Patient was taken to surgery the same day for exploratory laparotomy and repair of abdominal hernia causing obstruction of her small bowel. Postop NG tube remains in place, this was removed as her diet was slowly advanced, and now she is tolerating a regular diet. Patient also placed back on Synthroid for her hypothyroidism. The time of discharge, patient is tolerating oral food and hydration, was ambulating with assistance after she started therapies. Patient needs more therapy before discharge to home safely. \"    SUBJECTIVE  Subjective  Subjective: Pt reports they slept well last night however are very fatigued already this morning. Pt is agreeable to therapy. Pain: Pt reports a 3/10 pain on her incision site. OBJECTIVE       Functional Mobility  Standing Balance  Time: 4 minutes  Activity: Standing with UE unsupported for 4 minutes and SBA. Pt did 1 minute of standing with EO towards the end pt had some swaying. 30 second bouts of the following exercises: EC, eyes stable moving head, head stable moving eyes. Pt had increased sway backwards during EC.   Sit to Stand  Assistance Level: Stand by assist;Supervision  Skilled Clinical Factors: Pt reported fatigue before starting session and when completing first sit>stand of session required SBA in order to complete the activity and she was unsteady and required two attempts to rise from [facility] to RW. However when pt completed subsequent sit>stand transfers she was supervision and was able to complete the transfer with only increased time to complete. Pt complete 15 sit<>stands with supervision. Stand to Sit  Assistance Level: Supervision  Skilled Clinical Factors: Pt performed stand>sit with increased time to complete. Pt required supervision to complete especially when fatigued after ambulating. Environmental Mobility  Ambulation  Surface: Level surface  Device: Rolling walker  Distance: 18' with 2 turns, 21' with 1 turn, 21' with 1 turn  Activity: Within Unit  Activity Comments: Pt has in toeing on both LE however it is more pronounced on the RLE. Assistance Level: Stand by assist  Gait Deviations: Slow mya  Skilled Clinical Factors: Pt has in toeing on both LE however it is more pronounced on the RLE. When turning pt can get twisted up in their LE due to the in-toeing that is present. PT Exercises  Exercise Treatment: Standing with UE support at table: B 3 way hip (abduction, extension, flexion) 15 reps each side. Seated in wc adductor ball squeeze holding for 3 seconds for 15 reps. ASSESSMENT/PROGRESS TOWARDS GOALS    Assessment  Assessment: Pt had some increased fatigue at the start of the session which limited her transfers, however as session progressed she was able to complete sit<>stand transfer with supervision instead of SBA. Pt able to complete community level ambulation distances with use of RW on even surface with SBA however has IR of her LE which can cause her to shuffle during turns and run into her LEs. Pt has fair standing balance with some swaying during activity and SBA. Pt below their baseline and is experiencing some increased fatigue.  Pt will continue to benefit from skilled intervention in order to address their decreased endurance, strength and functional mobility in order to return to their PLOF. Activity Tolerance: Patient tolerated treatment well  Discharge Recommendations: Home with assist PRN;Home with Home health PT  PT Equipment Recommendations  Equipment Needed: No    Goals  Patient Goals   Patient goals : She wants to be able to do what she wants to do and return home independently. Short Term Goals  Time Frame for Short term goals: 1 week  Short term goal 1: Ambulating community level distances with mod I with us of RW. Short term goal 2: Ambulating household distances with mod I and SPC. Short term goal 3: Complete 12 steps mod I with use of bilateral handrails. Short term goal 4: Sit<>supine with mod I. Short term goal 5: Ascend/Descend curb step with wheeled walker with modified independent    [address]  minutes of therapy at least 5 out of 7 days a week  Current Treatment Recommendations: Strengthening;Balance training;Functional mobility training;Transfer training;Gait training;Stair training; Safety education & training  Safety Devices  Type of Devices: All fall risk precautions in place;Gait belt;Left in chair (in wheelchair to return to room with transportation)    Therapy Time   Individual Concurrent Group Co-treatment   Time In 0815         Time Out 0900         Minutes 45           Timed Code Treatment Minutes: [address], [facility], 09/14/2022 09:16 AM  Therapist was present, directed the patient's care, made skilled judgement, and was responsible for assessment and treatment of the patient.           Osvaldo Carrillo, [id number]

## 2022-09-14 NOTE — PLAN OF CARE
Problem: Discharge Planning  Goal: Discharge to home or other facility with appropriate resources  9/14/2022 1222 by Ilana Canada RN  Outcome: Progressing  Flowsheets (Taken 9/14/2022 0730)  Discharge to home or other facility with appropriate resources:   Identify barriers to discharge with patient and caregiver   Arrange for needed discharge resources and transportation as appropriate   Identify discharge learning needs (meds, wound care, etc)   Refer to discharge planning if patient needs post-hospital services based on physician order or complex needs related to functional status, cognitive ability or social support system     Problem: Skin/Tissue Integrity  Goal: Absence of new skin breakdown  Description: 1.   Monitor for areas of redness and/or skin breakdown  9/14/2022 1222 by Ilana Canada RN  Outcome: Progressing      Problem: Safety - Adult  Goal: Free from fall injury  9/14/2022 1222 by Ilana Canada RN  Outcome: Progressing  Flowsheets (Taken 9/14/2022 1219)  Free From Fall Injury: Hemanth Humphreych family/caregiver on patient safety     Problem: ABCDS Injury Assessment  Goal: Absence of physical injury  9/14/2022 1222 by Ilana Canada RN  Outcome: Progressing  Flowsheets (Taken 9/14/2022 1219)  Absence of Physical Injury: Implement safety measures based on patient assessment

## 2022-09-14 NOTE — PROGRESS NOTES
Patient admitted to rehab with debility, SBO.  A/Ox4. Transfers with walker x1. Mobility restrictions: WBAT. On regular diet, tolerating well. Medications taken whole with thin liquids. On heparin, chayo hose for DVT prophylaxis. Skin: redness to buttocks; surgical incision to abdomen. Oxygen: RA. LDA: none. Has been continent of bowel and incontinent of bladder. LBM 9/14/22. Chair/bed alarms in use and call light in reach. Will monitor for safety.  Electronically signed by Anne Marie Tran RN on 9/14/2022 at 12:25 PM

## 2022-09-14 NOTE — PROGRESS NOTES
This is a 80 y.o.  female admitted on 9/13/2022 with SBO (small bowel obstruction) [facility] [K56.609]. Alert and oriented x 4. No sign of distress. She is a full code. Has been incontinent of bladder this shift. Depends on. On heparin for DVT prophylaxis. Transfers with a walker x 1. On a regular diet. Takes her pills whole with thins. On room air. Surgical incision to the abdomen, open to air. clean, dry, and intact with dry dressing. Thierry hose removed . HS medication given. Tolerated well. Education provided . Call light  and bedside table within reach. Patient instructed to call if there is any needs or changes.

## 2022-09-14 NOTE — CONSULTS
Comprehensive Nutrition Assessment    Type and Reason for Visit:  Initial, Consult    Nutrition Recommendations/Plan:   Will continue to monitor while inpatient   Continue Regular diet. Malnutrition Assessment:  Malnutrition Status:  No malnutrition (09/14/22 1327)        Nutrition Assessment:    Rehab Consult. Pt admitted after recieving care for small bowel obstruction. Pt history of HTN and asthma. Pt wt is steady per care everywhere. Pt intakes per EMR are % (all of her meal was eaten per observation). Pt reports that her appetite is normal. Will continue to monitor while inpatient. Nutrition Related Findings:    Reviewed Labs. Elevated BUN - 23. Wound Type: None       Current Nutrition Intake & Therapies:    Average Meal Intake: %  Average Supplements Intake: None Ordered  ADULT DIET; Regular    Anthropometric Measures:  Height: 5' 3\" (160 cm)  Ideal Body Weight (IBW): 115 lbs (52 kg)    Admission Body Weight: 115 lb 4.8 oz (52.3 kg)  Current Body Weight: 117 lb 15.1 oz (53.5 kg), 102.6 % IBW.  Weight Source: Bed Scale  Current BMI (kg/m2): 20.9        Weight Adjustment For: No Adjustment                 BMI Categories: Underweight (BMI less than 22) age over 72    Estimated Daily Nutrient Needs:  Energy Requirements Based On: Kcal/kg  Weight Used for Energy Requirements: Current  Energy (kcal/day): 1605 kcals - 1873 kcals (30-35 kcals 53.5 kg)  Weight Used for Protein Requirements: Current  Protein (g/day): 75 g - 86 g (1.4 g - 1.6 g 53.5 kg)  Method Used for Fluid Requirements: 1 ml/kcal  Fluid (ml/day): 1605 ml - 1873 ml    Nutrition Diagnosis:   No nutrition diagnosis at this time     Nutrition Interventions:   Food and/or Nutrient Delivery: Continue Current Diet  Nutrition Education/Counseling: No recommendation at this time  Coordination of Nutrition Care: Continue to monitor while inpatient       Goals:     Goals: Meet at least 75% of estimated needs       Nutrition Monitoring and Evaluation:   Behavioral-Environmental Outcomes: None Identified  Food/Nutrient Intake Outcomes: None Identified  Physical Signs/Symptoms Outcomes: Biochemical Data    Discharge Planning:     Too soon to determine     [address]: [phone]

## 2022-09-15 PROCEDURE — 97110 THERAPEUTIC EXERCISES: CPT

## 2022-09-15 PROCEDURE — 97116 GAIT TRAINING THERAPY: CPT

## 2022-09-15 PROCEDURE — 97535 SELF CARE MNGMENT TRAINING: CPT

## 2022-09-15 PROCEDURE — 97530 THERAPEUTIC ACTIVITIES: CPT

## 2022-09-15 PROCEDURE — 6360000002 HC RX W HCPCS: Performed by: PHYSICAL MEDICINE & REHABILITATION

## 2022-09-15 PROCEDURE — 6370000000 HC RX 637 (ALT 250 FOR IP): Performed by: PHYSICAL MEDICINE & REHABILITATION

## 2022-09-15 PROCEDURE — 1280000000 HC REHAB R&B

## 2022-09-15 PROCEDURE — 94760 N-INVAS EAR/PLS OXIMETRY 1: CPT

## 2022-09-15 RX ORDER — LATANOPROST 50 UG/ML
1 SOLUTION/ DROPS OPHTHALMIC NIGHTLY
Status: DISCONTINUED | OUTPATIENT
Start: 2022-09-15 | End: 2022-09-22 | Stop reason: HOSPADM

## 2022-09-15 RX ADMIN — Medication 1 TABLET: at 07:56

## 2022-09-15 RX ADMIN — HEPARIN SODIUM 5000 UNITS: 5000 INJECTION INTRAVENOUS; SUBCUTANEOUS at 15:03

## 2022-09-15 RX ADMIN — TAMSULOSIN HYDROCHLORIDE 0.4 MG: 0.4 CAPSULE ORAL at 20:52

## 2022-09-15 RX ADMIN — HEPARIN SODIUM 5000 UNITS: 5000 INJECTION INTRAVENOUS; SUBCUTANEOUS at 05:24

## 2022-09-15 RX ADMIN — SENNOSIDES AND DOCUSATE SODIUM 2 TABLET: 50; 8.6 TABLET ORAL at 07:56

## 2022-09-15 RX ADMIN — LATANOPROST 1 DROP: 50 SOLUTION/ DROPS OPHTHALMIC at 20:52

## 2022-09-15 RX ADMIN — FLUTICASONE PROPIONATE 1 SPRAY: 50 SPRAY, METERED NASAL at 07:57

## 2022-09-15 RX ADMIN — Medication 1 TABLET: at 20:52

## 2022-09-15 RX ADMIN — THERA TABS 1 TABLET: TAB at 07:55

## 2022-09-15 RX ADMIN — LEVOTHYROXINE SODIUM 100 MCG: 0.1 TABLET ORAL at 05:24

## 2022-09-15 RX ADMIN — HEPARIN SODIUM 5000 UNITS: 5000 INJECTION INTRAVENOUS; SUBCUTANEOUS at 20:52

## 2022-09-15 NOTE — PROGRESS NOTES
Occupational Therapy  Facility/Department: [facility]  Rehabilitation Occupational Therapy Daily Treatment Note    Date: 9/15/22  Patient Name: Jong Leos       Room: Q1X-2839/9754-48  MRN: [id number]  Account: [de-identified]   : 1/10/1928  (80 y.o.) Gender: female                    Past Medical History:  has no past medical history on file. Past Surgical History:   has no past surgical history on file. Restrictions  Restrictions/Precautions: Fall Risk  Other position/activity restrictions: abdominal incision    Subjective  Subjective: pt met bedside, agreeable to OT< shower  Restrictions/Precautions: Fall Risk             Objective     Cognition  Overall Cognitive Status: Exceptions  Memory: Decreased short term memory  Cognition Comment: min slow to answer/process at times, complicated by [facility] Some difficulty remembering therapy activities from day to day  Orientation  Overall Orientation Status: Within Functional Limits  Orientation Level: Oriented X4 (BIMs 15/15)         ADL  Grooming/Oral Hygiene  Assistance Level: Supervision  Skilled Clinical Factors: standing at sink for oral care, hair  Upper Extremity Bathing  Assistance Level: Set-up  Skilled Clinical Factors: covered abd incision/staples  Lower Extremity Bathing  Assistance Level: Stand by assist  Skilled Clinical Factors: long sponge for LE to bathe, towel /reacher to dry. Stood with SBA, grab bar to bathe buttocks/db area  Upper Extremity Dressing  Assistance Level: Set-up  Skilled Clinical Factors: camisole, dress  Lower Extremity Dressing  Assistance Level: Set-up; Stand by assist  Skilled Clinical Factors: reacher to thread briefs over feet, over hips with SBA. Dress down SBA  Putting On/Taking Off Footwear  Equipment Provided: Reachers (long shoe horn)  Assistance Level: Verbal cues; Set-up  Skilled Clinical Factors: attempted to reach feet for ISHAN hose, but unable, stated she can have neighbor assist if needed.   Shoes with elastic laces, long shoe horn, reacher with min cues  Tub/Shower Transfers  Type: Shower  Transfer To: Shower chair with back  Assistance Level: Stand by assist  Skilled Clinical Factors: grab bar          Functional Mobility  Device: Cane  Activity: To/From bathroom  Assistance Level: Stand by assist;Contact guard assist  Skilled Clinical Factors: no LOB< but amb slowly and cautiously, min unsteadiness, but no assist to recover  Transfers  Surface: To bed; To chair with arms  Device: Cane  Sit to Stand  Assistance Level: Stand by assist;Contact guard assist       PM session  Pt met bedside, seated in wheelchair, ready for therapy. Pt requesting to go outside  [facility] to manage door, pt amb through doorway, over doorsill, SBA using hurrycane style cane for community integration over various textured surfaces on therapy terrace. Tolerated standing and walking approx 8 minutes on terrace to improve activity tolerance. AMb back to dept, transferred to wheelchair  UE ex - provided with orange resistance band and written exercises for HEP to improve activity tolerance for ADL, mobility  Shoulder flex/horizontal abd/add, bilat horizontal abd/add, elbow flex, elbow ext, chest press  Pt took band and written ex to room for use over the weekend. Pt to PT after OT    Assessment  Assessment  Assessment: Pt has improved with self care. She is able to bathe on shower chair with SBA, dress UB after set up, LB with min assist due to ISHAN hose. Uses AD appropriately with min cues. Pt is using a st cane with triangualr support today with CGA/SBA for transfers and mobility. She is improving with her confidence, still min difficulty with memory, some difficulty remembering therapist from yesterday, activities completed. Anticipate pt will be ready for discharge next week with most likely home OT services  Activity Tolerance: Patient tolerated treatment well  Discharge Recommendations: Home with Home health OT; Home with assist PRN  OT Equipment Recommendations  Equipment Needed: No  Other: most likely has all needed equipment       Patient Education  Education  Education Given To: Patient  Education Provided: ADL Function;Mobility Training;Transfer Training  Education Provided Comments: ISHAN hose  Education Method: Demonstration;Verbal;Teach Back  Education Outcome: Verbalized understanding;Demonstrated understanding;Continued education needed    Plan  Plan  Times per Week: 5-6  Times per Day: Twice a day  Plan Weeks: 1 week  Current Treatment Recommendations: Strengthening;Balance training;Functional mobility training; Endurance training; Safety education & training;Patient/Caregiver education & training;Equipment evaluation, education, & procurement;Self-Care / ADL    Goals  Patient Goals   Patient goals : I want to get stronger and be able to take care of myself again  Short Term Goals  Time Frame for Short term goals: 1 week pt will. .  Short Term Goal 1: bathe indep with shower chair, grab bars  Short Term Goal 2: dress UB and LB indep except ISHAN hose if she needs to continue to wear  Short Term Goal 3: toilet indep with grab bars  Short Term Goal 4: transfer indep with LRAD  Short Term Goal 5: functional mobility  and simple meal prep indep with LRAD  Short Term Goal 6: improve activity tolerance to stand 8 min for ADL/IADL tasks  Long Term Goals  Time Frame for Long term goals : same as stg                   Therapy Time   Individual Concurrent Group Co-treatment   Time In 0815         Time Out 0915         Minutes 60         Timed Code Treatment Minutes: 60 Minutes   Therapy Time     Individual Co-treatment   Time In 1300     Time Out 1345     Minutes 939 V[address] Solis OTR/L 770

## 2022-09-15 NOTE — CARE COORDINATION
Chart Reviewed. Met with patient to introduce  role, initiate discussion regarding DC planning and to inform of weekly Team Conferences to review her progress. ACP completed. Pt has a follow up appt on 2022 with Benjamin Traore. Will alert Dr Ji Alberto of this and plan for it if needed. SOCIAL WORK ASSESSMENT      GOAL:   To return home, alone. HOME SITUATION:  Pt lives in a condo, alone, level entry into building and her condo is on the first floor. She can park in the garage and have one step into the building. She has no plans for future needs; wants to remain in her condo for as long as she can. She does not wish to make any future plans. Her sister just . She has a grand nephew and and a niece who will be able to check in on her from time to time. She reports she does have a living Will naming her niece as the [facility] agent. Pcp:    Dr Erika Schneider:   Ever Cerrtao        PRIOR LEVEL OF FUNCTIONING:       PERSONAL CARE:    totally independent                                                                       DRIVES: yes                                                                     FINANCES:she reports her nephew will start assisting her with finances                                                                   MEALS:   totally independent                              GROCERY SHOPS:  independent      DME CURRENTLY AT HOME: wh walker, cne, bar in shower, shower chair. CURRENT HOME CARE/SERVICES:    none currently. Informed her of possible post acute services such as home care or out pateint services to continue her progress. Provided her with CMS List of home care agencies for her review. PREFERRED HOME CARE:   TBD     She expressed that she probably won't need home care upon discharge. ADVANCED DIRECTIVES:   She reports her niece, Duncan Nevarez is her [address]:  . Informed her that Weekly Team Conferences will be held on Tuesday to review her progress, goals, DME recommendations and DC date. This worker will update her weekly for plan. LSW informed patient of preferred  time on date of discharge which is between 10 - 12 noon. LSW informed patient of recommendation for PCP visit within 7 days post discharge.     Eliseo Galvez     Case Management   [phone]    9/15/2022  4:16 PM

## 2022-09-15 NOTE — PROGRESS NOTES
Physical Therapy  Facility/Department: [address] REHAB  Rehabilitation Physical Therapy Treatment Note    NAME: Letty Tijerina  : 1/10/1928 (89 y.o.)  MRN: [id number]  CODE STATUS: Full Code    Date of Service: 9/15/22       Restrictions:        SUBJECTIVE  Subjective  Subjective: denies c/o  Pain: denies pain at this time     OBJECTIVE  Cognition  Overall Cognitive Status: Exceptions  Memory: Decreased short term memory  Cognition Comment: min slow to answer/process at times, complicated by [facility] Some difficulty remembering therapy activities from day to day  Orientation  Overall Orientation Status: Within Functional Limits  Orientation Level: Oriented X4 (BIMs 15/15)    Functional Mobility  Transfers  Surface: From chair with arms  Device: Cane  Sit to Stand  Assistance Level: Stand by assist;Supervision  Stand to Sit  Assistance Level: Supervision      Environmental Mobility  Ambulation  Surface: Level surface  Device: [employer]: 320' with 3 turns and 2 thresholds CGA to close SBA with once instance of scuffing R foot that resulted in LOB requiring min A to recover, one instance of scuffing L foot lightly that required CGA to recover, pt then more careful to pick feet up in swing, decreased dorsiflexion noted B in swing, toe-in with R foot in stance  Activity: Within Unit  Activity Comments: Pt has in toeing on both LE however it is more pronounced on the RLE, toe-in to neutral on L  Assistance Level: Stand by assist  Gait Deviations: Slow mya  Stairs  Stair Height: 6''  Device: Cane  Number of Stairs: 12  Additional Factors: Non-reciprocal going down;Non-reciprocal going up; Increased time to complete  Skilled Clinical Factors: pr circumducts R LE to clear toes when going up steps, leads up with R foot    PT Exercises  Exercise Treatment: seated AP, ankle circles, TKE, hip flex x 15, add sets, green theraband abduction  Dynamic Sitting Balance Exercises: sidestepping in // bars 5' each direction x 2 with no hand support and CGA/close SBA and no LOB, tapped 4\" step with alternating feet with no hand support and CGA, balance better when standing on R LE, scuffed R foot on step x 2  Static Standing Balance Exercises: standing toe lifts, marching, hamstring curls, and mini squats x 15  ASSESSMENT/PROGRESS TOWARDS GOALS   Assessment  Assessment: Pt completed all ambulation today with SPC and close SBA to CGA except for one LOB when R foot caught on floor and she needed min A to recover her balance. B feet lack full dorsiflexion in swing and pt compensates by marching her knees up a little higher. Pt was able to participate in B strengthening and balance ex this morning. Pt will benefit form continued therapy for strengthening, balance, and safety before returning to their prior living situation. Activity Tolerance: Patient tolerated treatment well  Discharge Recommendations: Home with assist PRN;Home with Home health PT    Goals  Patient Goals   Patient goals : She wants to be able to do what she wants to do and return home independently. Short Term Goals  Time Frame for Short term goals: 1 week  Short term goal 1: Ambulating community level distances with mod I with us of RW. Short term goal 2: Ambulating household distances with mod I and SPC. Short term goal 3: Complete 12 steps mod I with use of bilateral handrails. Short term goal 4: Sit<>supine with mod I. Short term goal 5: Ascend/Descend curb step with wheeled walker with modified independent    [address]  minutes of therapy at least 5 out of 7 days a week  Current Treatment Recommendations: Strengthening;Balance training;Functional mobility training;Transfer training;Gait training;Stair training; Safety education & training  Safety Devices  Type of Devices:  All fall risk precautions in place;Gait belt;Left in chair    Therapy Time   Individual Concurrent Group Co-treatment   Time In 0945         Time Out 1030 Minutes 45           Timed Code Treatment Minutes: Rashad Baca, 09/15/22 at 10:39 AM     PM session: pt reports being less tired than this morning. Pt reports her stomach is sore when she moves but it is \"not really that bad. \"  Transfers sit to stand SBA. Model car transfer SBA with extra time, pt recalls technique. Up and down 6\" curb min A. Amb 150' with SPC with base SBA, pt with faster pace, pt picking toes up better this afternoon with pt able to achieve minimal heel strike on L and foot flat on R, no feet scuffing. Bed mobility with regular flat bed S sit <> supine, rolling modif I.   Standing tossing bean bags at target with reaching for bean bags on R and tossing at target with good weight shift and no LOB. Able to pick 5 bean bags up from the floor with a reacher CGA/SBA and no LOB form standing position. NuStep level 3, seat and hand position 6 x 5 min.   Second Session Therapy Time     Individual Co-treatment   Time In 2192     Time Out 1430     Minutes 45        Electronically signed by Deny Mota PT on 9/15/2022 at 2:34 PM

## 2022-09-15 NOTE — PLAN OF CARE
Problem: Discharge Planning  Goal: Discharge to home or other facility with appropriate resources  Outcome: Progressing  Flowsheets (Taken 9/15/2022 0753)  Discharge to home or other facility with appropriate resources:   Identify barriers to discharge with patient and caregiver   Arrange for needed discharge resources and transportation as appropriate   Identify discharge learning needs (meds, wound care, etc)   Refer to discharge planning if patient needs post-hospital services based on physician order or complex needs related to functional status, cognitive ability or social support system     Problem: Skin/Tissue Integrity  Goal: Absence of new skin breakdown  Description: 1.   Monitor for areas of redness and/or skin breakdown  Outcome: Progressing      Problem: Safety - Adult  Goal: Free from fall injury  Outcome: Progressing  Flowsheets (Taken 9/15/2022 1243)  Free From Fall Injury: Instruct family/caregiver on patient safety     Problem: ABCDS Injury Assessment  Goal: Absence of physical injury  Outcome: Progressing  Flowsheets (Taken 9/15/2022 1243)  Absence of Physical Injury: Implement safety measures based on patient assessment

## 2022-09-15 NOTE — PROGRESS NOTES
Patient admitted to rehab with debility. A/Ox4. Transfers with cane x1. Mobility restrictions: WBAT. On regular diet, tolerating well. Medications taken whole with thin liquids. On heparin, chayo hose for DVT prophylaxis. Skin: redness to buttocks; heels soft; incision to abdomen. Oxygen: RA. LDA: none. Has been continent of bowel and mostly continent of bladder. LBM 9/15/22. Chair/bed alarms in use and call light in reach. Will monitor for safety.  Electronically signed by Wai Mitchell RN on 9/15/2022 at [address] PM

## 2022-09-15 NOTE — PROGRESS NOTES
[employer]  9/15/2022  [id number]    Chief Complaint: SBO (small bowel obstruction) ([address]    Subjective: Patient seen this AM.  Patient started in therapies to improve her transfers, gait, and self-care activities. Patient placed on laxatives to help with her BMs. Repeat labs yesterday look good and are stable. Her abdominal incision appears to be healing well. ROS: No N/C, SOB, chest pain, C/F. Objective:  Patient Vitals for the past 24 hrs:   BP Temp Temp src Pulse Resp SpO2 Height Weight   09/15/22 0453 (!) 118/57 97.8 °F (36.6 °C) Oral 80 18 94 % -- 114 lb 3.2 oz (51.8 kg)   09/14/22 2151 (!) 147/70 97.5 °F (36.4 °C) Oral 87 16 96 % -- --   09/14/22 1442 (!) 150/62 97.9 °F (36.6 °C) Oral 68 17 96 % -- --   09/14/22 1131 -- -- -- -- -- -- 5' 3\" (1.6 m) --   09/14/22 1037 -- -- -- -- -- 94 % -- --       Gen: No distress, pleasant. HEENT: Normocephalic, atraumatic. CV: Regular rate and rhythm. Resp: No respiratory distress. Abd: Soft, nontender   Ext: No edema. Neuro: Alert, oriented, appropriately interactive.      Wt Readings from Last 3 Encounters:   09/15/22 114 lb 3.2 oz (51.8 kg)       Laboratory data:   Lab Results   Component Value Date    WBC 5.6 09/14/2022    HGB 13.5 09/14/2022    HCT 39.7 09/14/2022    MCV 92.9 09/14/2022     09/14/2022       Lab Results   Component Value Date/Time     09/14/2022 08:05 AM    K 4.6 09/14/2022 08:05 AM     09/14/2022 08:05 AM    CO2 26 09/14/2022 08:05 AM    BUN 23 09/14/2022 08:05 AM    CREATININE 0.7 09/14/2022 08:05 AM    GLUCOSE 108 09/14/2022 08:05 AM    CALCIUM 9.5 09/14/2022 08:05 AM        Therapy progress:  PT  Position Activity Restriction  Other position/activity restrictions: abdominal incision  Objective     Sit to Stand: Supervision  Stand to sit: Supervision  Bed to Chair: Supervision  Device: [facility]  Assistance: Stand by assistance  Distance: 200' with 2 turns, [de-identified]' with 4 turns on uneven surface (carpet)  OT  PT Equipment Recommendations  Equipment Needed: No  Toilet - Technique: Ambulating  Equipment Used: Grab bars (comfort height commode)             Body mass index is 20.23 kg/m². Assessment and Plan:     Small Bowel Obstruction - tramadol     Hypertension-monitor     Hypothyroid- synthroid     Asthma -Flonase     glaucoma -Xalatan     Bowels: Schedule Miralax + Senna S. Follow bowel movements. Enema or suppository if needed. Bladder: Check PVR x 3. [address] if PVR > 200ml or if any volume is > 500 ml. Pain:  Tramadol is ordered prn.          Liban Stinson MD 9/15/2022, 7:30 AM

## 2022-09-15 NOTE — PROGRESS NOTES
Pt awake and AAO sitting up in bed watching TV. Denies pain. Pt admitted after SBO and ex/lap on 9/5 at [facility]. SI to abd well-approximated with staples and GENE. Belly soft and round with + BS. Pt is passing flatus and BM's. Voids urine without difficulty. 2+ pitting edema to lower legs. Heels soft and [place] applied and heels floated off bed. Call light in reach. Bed alarm on.

## 2022-09-16 PROCEDURE — 6360000002 HC RX W HCPCS: Performed by: PHYSICAL MEDICINE & REHABILITATION

## 2022-09-16 PROCEDURE — 97535 SELF CARE MNGMENT TRAINING: CPT

## 2022-09-16 PROCEDURE — 1280000000 HC REHAB R&B

## 2022-09-16 PROCEDURE — 97110 THERAPEUTIC EXERCISES: CPT

## 2022-09-16 PROCEDURE — 97530 THERAPEUTIC ACTIVITIES: CPT

## 2022-09-16 PROCEDURE — 94760 N-INVAS EAR/PLS OXIMETRY 1: CPT

## 2022-09-16 PROCEDURE — 6370000000 HC RX 637 (ALT 250 FOR IP): Performed by: PHYSICAL MEDICINE & REHABILITATION

## 2022-09-16 PROCEDURE — 97116 GAIT TRAINING THERAPY: CPT

## 2022-09-16 RX ADMIN — Medication 1 TABLET: at 10:58

## 2022-09-16 RX ADMIN — HEPARIN SODIUM 5000 UNITS: 5000 INJECTION INTRAVENOUS; SUBCUTANEOUS at 05:35

## 2022-09-16 RX ADMIN — THERA TABS 1 TABLET: TAB at 10:58

## 2022-09-16 RX ADMIN — HEPARIN SODIUM 5000 UNITS: 5000 INJECTION INTRAVENOUS; SUBCUTANEOUS at 13:41

## 2022-09-16 RX ADMIN — TAMSULOSIN HYDROCHLORIDE 0.4 MG: 0.4 CAPSULE ORAL at 21:41

## 2022-09-16 RX ADMIN — FLUTICASONE PROPIONATE 1 SPRAY: 50 SPRAY, METERED NASAL at 10:58

## 2022-09-16 RX ADMIN — LATANOPROST 1 DROP: 50 SOLUTION/ DROPS OPHTHALMIC at 21:43

## 2022-09-16 RX ADMIN — HEPARIN SODIUM 5000 UNITS: 5000 INJECTION INTRAVENOUS; SUBCUTANEOUS at 21:40

## 2022-09-16 RX ADMIN — Medication 1 TABLET: at 21:41

## 2022-09-16 RX ADMIN — LEVOTHYROXINE SODIUM 100 MCG: 0.1 TABLET ORAL at 05:35

## 2022-09-16 NOTE — PLAN OF CARE
Problem: Discharge Planning  Goal: Discharge to home or other facility with appropriate resources  Outcome: Progressing  Flowsheets (Taken 9/16/2022 1102)  Discharge to home or other facility with appropriate resources: Identify barriers to discharge with patient and caregiver     Problem: Skin/Tissue Integrity  Goal: Absence of new skin breakdown  Description: 1. Monitor for areas of redness and/or skin breakdown  2. Assess vascular access sites hourly  3. Every 4-6 hours minimum:  Change oxygen saturation probe site  4. Every 4-6 hours:  If on nasal continuous positive airway pressure, respiratory therapy assess nares and determine need for appliance change or resting period.   Outcome: Progressing     Problem: Safety - Adult  Goal: Free from fall injury  Outcome: Progressing  Flowsheets (Taken 9/16/2022 1208)  Free From Fall Injury: Instruct family/caregiver on patient safety     Problem: ABCDS Injury Assessment  Goal: Absence of physical injury  Outcome: Progressing  Flowsheets (Taken 9/16/2022 1208)  Absence of Physical Injury: Implement safety measures based on patient assessment

## 2022-09-16 NOTE — PROGRESS NOTES
Patient admitted to rehab with debility. A/Ox4. Transfers with [employer]. Mobility restrictions: WBAT. On Reg diet, tolerating well. Medications taken whole with thins. On heparin, chayo hose for DVT prophylaxis. Skin: reddened buttocks, triad cream heels, mepilex, inc to ABD, Staples. Oxygen: RA. LDA: none. Has been continent of bowel and sometimes continent of bladder. LBM 9/16. Chair/bed alarms in use and call light in reach.

## 2022-09-16 NOTE — PROGRESS NOTES
Physical Therapy  Facility/Department: [facility] REHAB  Rehabilitation Physical Therapy Treatment Note    NAME: Sloane Blake  : 1/10/1928 (80 y.o.)  MRN: [id number]  CODE STATUS: Full Code    Date of Service: 22       Restrictions:  Restrictions/Precautions: Fall Risk  Position Activity Restriction  Other position/activity restrictions: abdominal incision   Pertinent medical information:  Additional Pertinent Hx: Per Dr. Nicolas Silvestre H&P, \"Patient is admitted from [facility] after treatment for small bowel obstruction. She is a 42-year-old female with a known history of hypertension, asthma, glaucoma, who presented on  to [facility] with high-grade mechanical small bowel obstruction. Patient was taken to surgery the same day for exploratory laparotomy and repair of abdominal hernia causing obstruction of her small bowel. Postop NG tube remains in place, this was removed as her diet was slowly advanced, and now she is tolerating a regular diet. Patient also placed back on Synthroid for her hypothyroidism. The time of discharge, patient is tolerating oral food and hydration, was ambulating with assistance after she started therapies. Patient needs more therapy before discharge to home safely. \"   SUBJECTIVE  Subjective  Subjective: Pt arrived at therapy in [facility]. Pt is agreeable to therapy and pleasant. Pain: denies pain at this time      OBJECTIVE  Cognition  Overall Cognitive Status: Exceptions  Memory: Decreased short term memory  Cognition Comment: min slow to answer/process at times, complicated by [address].   Some difficulty remembering therapy activities from day to day  Orientation  Overall Orientation Status: Within Functional Limits  Orientation Level: Oriented X4 (BIMs 15/15)    Functional Mobility  Bed Mobility  Overall Assistance Level: Modified Independent;Stand By Assist  Additional Factors: Increased time to complete  Roll Left  Assistance Level: Stand by assist  Skilled Clinical Factors: SPT surface  Device: Cane  Distance: 18' with cane and CGA  Activity: Within Unit  Activity Comments: Pt has in toeing on both LE however it is more pronounced on the RLE, toe-in to neutral on L. Pt exhibits some shuffling of their gait also and has some scissoring that is more pronounced as they become fatigued. RLE tends to cross over midline more frequently then their LLE. Gait Deviations: Slow mya  Skilled Clinical Factors: Pt has in toeing on both LE however it is more pronounced on the RLE. When turning pt can get twisted up in their LE due to the in-toeing that is present. PT Exercises  Functional Mobility Circuit Training: Ambulation over sticks with SPC a total of 20' with turn. Pt was SBA during this activity. This was to focus on lifting their knees during ambulation in order to address the shuffling. Pt completed this activity with no LOB but required increased time to complete and had to take a standing break between each stick in order to catch their balance before continuing to ambulate. Dynamic Standing Balance Exercises: Hitting balloon back and forth with PT while SPT guards pt in standing with no UE support. Pt completed one trial for 3 minutes, they were CGA to Aram as pt had one LOB episode during activity and required min A from SPT in order to correct and return to stable position. SPT provided verbal cues to put even weight distribution through fet in order to help limit their backward sway which was very prominent. Pt also completed two trials of hitting the balloon between LUE and RUE focusing on hitting the balloon up into the air to focus on looking up while having to balance in standing with no UE. Pt was CGA during this activity. Pt had some posterior sway during this activity but was able to maintain their balance without any LOB episodes.       ASSESSMENT/PROGRESS TOWARDS GOALS       Assessment  Assessment: Pt completed all ambulation today with SPC and close SBA during ambulation pt has in-toeing on both LE and had some scissoring and shuffling of their gait that became more pronounced as they became fatigued. Worked on some bed mobility to help address their pain while completing activity however the modifications that were made were not successful in stopping the patient from having pain. Pt worked on some balance activities today during which pt was CGA-min A as they had a LOB episode. Pt will benefit form continued therapy for strengthening, balance, and safety before returning to their prior living situation. Activity Tolerance: Patient tolerated treatment well  Discharge Recommendations: Home with assist PRN;Home with Home health PT  PT Equipment Recommendations  Equipment Needed: No  PM Session:  Subjective: Pt agreeable and pleasant this pm. Pt denies pain. Objective: Sit<>stand with mod I, however when ascending pt holds onto the armrests in order to gain balance when transitioning to standing. Pt completed 12 steps with bilateral handrails and Supervision. Pt was reciprocal when ascending stairs but was non reciprocal when descending and lead with the RLE. Pt ambulated 26' with SPC and supervision. Pt can be unsteady at times where their LE cross over. Seated rest break after this ambulation. Sit<>stand with mod I, however when ascending pt holds onto the armrests in order to gain balance when transitioning to standing. Lateral walking with no AD and SBA, 28', 14' to the R and 14' to the L. Completed two trials of this with no rest break. Went straight into backwards walking with no AD and SBA, 32' with 1 turn X 2 trials with no rest break between. During this activity pt had slow gait and stopped to catch their balance a couple times. Seated rest break. Sit<>stand with mod I, however when ascending pt holds onto the armrests in order to gain balance when transitioning to standing.     Standing with marches B X 15 with UE support of SPT hands for the first five and then no UE support and CGA for the following 10 reps. Ambulated 190' X 3 turns with no AD and SBA. Pt was slightly unsteady during ambulation and when they became fatigued they had some more shuffling of their gait. Standing with B UE of SPT arms heel raises X 15. Pt was fatigued following this exercise. Assessment: Pt is able to ambulate community level distances with no AD and SBA and with SPC supervision. They are able to complete 12 steps with supervision and some reports of fatigue following activity. Pt continues to have some decreased endurance during activity but it is improving. They will continue to benefit from skilled intervention in order to work on gait training, strengthening, endurance and functional mobility in order to return to their PLOF. Goals  Patient Goals   Patient goals : She wants to be able to do what she wants to do and return home independently. Short Term Goals  Time Frame for Short term goals: 1 week  Short term goal 1: Ambulating community level distances with mod I with us of RW. Short term goal 2: Ambulating household distances with mod I and SPC. Short term goal 3: Complete 12 steps mod I with use of bilateral handrails. Short term goal 4: Sit<>supine with mod I. Short term goal 5: Ascend/Descend curb step with wheeled walker with modified independent    [address]  minutes of therapy at least 5 out of 7 days a week  Current Treatment Recommendations: Strengthening;Balance training;Functional mobility training;Transfer training;Gait training;Stair training; Safety education & training  Safety Devices  Type of Devices:  All fall risk precautions in place;Gait belt;Left in chair  Restraints  Restraints Initially in Place: No    EDUCATION  Education  Education Given To: Patient  Education Provided: Role of Therapy;Plan of Care;Transfer Training;Mobility Training  Education Provided Comments: log roll, safety with hand placement  Education Method: Demonstration;Verbal  Barriers to Learning: Hearing  Education Outcome: Demonstrated understanding;Verbalized understanding        Therapy Time   Individual Concurrent Group Co-treatment   Time In 0900         Time Out 0945         Minutes 45           Timed Code Treatment Minutes: 45 Minutes  Second Session Therapy Time     Individual Co-treatment   Time In 2496     Time Out 1430     Minutes [address] [place] [address], 09/16/22 at 11:28 AM  Therapist was present, directed the patient's care, made skilled judgement, and was responsible for assessment and treatment of the patient.        Porfirio Bone, [id number]

## 2022-09-16 NOTE — PROGRESS NOTES
Occupational Therapy  Facility/Department: [facility]  REH[facility]  Rehabilitation Occupational Therapy Daily Treatment Note    Date: 22  Patient Name: Golden Mirza       Room: W4E-4922/6727-28  MRN: [id number]  Account: [de-identified]   : 1/10/1928  (80 y.o.) Gender: female                    Past Medical History:  has no past medical history on file. Past Surgical History:   has no past surgical history on file. Restrictions  Restrictions/Precautions: Fall Risk  Other position/activity restrictions: abdominal incision    Subjective  Subjective: pt met in dept, agreeable to OT  Restrictions/Precautions: Fall Risk             Objective     Cognition  Overall Cognitive Status: Exceptions  Memory: Decreased short term memory  Cognition Comment: min slow to answer/process at times, complicated by [facility] Some difficulty remembering therapy activities from day to day  Orientation  Overall Orientation Status: Within Functional Limits  Orientation Level: Oriented X4 (BIMs 15/15)         ADL       Instrumental ADL's  Instrumental ADLs: Yes  Light Housekeeping  Light Housekeeping Level: [employer] Level of Assistance: Stand by assistance  Light Housekeeping: pt able to gather laundry from dryer, cues to use top of dryer for support vs cane. Amb with cane with SBA to/from therapy gym, min cues for pathfinding. anticipate pt will be able to complete her laundry at discharge     Functional Mobility  Device: Cane  Assistance Level: Stand by assist  Transfers  Surface: Wheelchair; To chair with arms (couch)  Device: Cane  Sit to Stand  Assistance Level: Stand by assist  Stand to Sit  Assistance Level: Stand by assist   OT Exercises  Resistive Exercises: attempted 1 lb wt shoulder, but uncomfortable, so completed AROM shoulder 10 reps BUE.  elbow flex/ext, sup/pron, wrist flex/ext with 1 lb dumbbell to improve activity tolerance for ADL, mobility       PM session  Pt met in dept, agreeable to OT  Stated she wanted to amb without the cane. Pt amb from activity room to ancillary gym with SBA, but did require one instance of CGA, stated she caught her toe. Mock car transfer  - holding door to get into seat, which was not what was recommended, but she completed without assist.  Educated regarding sit/swing - able to get legs in/out per self, used door to assist minimally which was not recommended, but she didn't feel she could push from the seat or dashboard due to her limited shoulder function. Returned to dept for short rest break in wheelchair  Hand bike 5 minutes to improve activity tolerance for ADL, mobility  Mobility - amb to kitchen, able to retrieve cup from upper cabinet, fill with water, place to microwave to heat 30 sec, then carry to table without device    Returned to wheelchair. Transported to room per transport at end of session. Assessment  Assessment  Assessment: Pt agreeable to change in therapy schedule to earlier. Amb with hurrycane and SBA, more stedy today, faster pace but still safe. Completed simple laundry task with SBA. Improving with activity tolerance for distance with mobility, UE ex, less rest breaks during session. Pt stated she thinks she has an ortho appt sept 21, anticipate she may be able to be discharged Wed to her appt if ok with Dr Willi Vázquez and the team.  Will discuss and make a plan - she is to check with her nephew  Activity Tolerance: Patient tolerated treatment well  Discharge Recommendations: Home with Home health OT; Home with assist PRN  OT Equipment Recommendations  Other: most likely has all needed equipment  Safety Devices  Safety Devices in place: Yes  Type of devices: Gait belt (to room per transport at end of session)    Patient Education  Education  Education Given To: Patient  Education Provided:  Mobility Training;Transfer Training  Education Method: Demonstration;Verbal;Teach Back  Education Outcome: Verbalized understanding;Demonstrated understanding;Continued

## 2022-09-16 NOTE — PROGRESS NOTES
Pt awake and AAO sitting up in chair requesting to get ready for bed and go to the BR. Pt admitted with debility following acute SBO and exp/lap on 9/5 at [facility]. SI to mid low abd well-approximated with staples and GENE. Belly round and soft with active BS. LBM today. Pt has been continent of urine. Lungs CTA. No sob or cough. On RA. 2+ pitting edema to legs. TEDS doffed. Pt up from sit to stand with CGA and GB and cane. Pt toilets with CGA. Voided urine. Triad applied to reddened buttocks. Mepilex to bilat heels for protection. Heels floated off bed. Doffed day clothes and donned gown with set-up. Pt to bed and able to get into bed on own. Call light in reach. Bed alarm on.

## 2022-09-16 NOTE — PROGRESS NOTES
Mega Ayers  9/16/2022  [id number]    Chief Complaint: SBO (small bowel obstruction) ([address]    Subjective: Patient seen this AM.  Patient doing well in her therapies, walking over 300 feet with a cane, going up and down 12 steps with standby to contact-guard assist.  Repeat labs look good and are stable. Her abdominal incision appears to be healing well. We should be able to discharge her to home next week. ROS: No N/C, SOB, chest pain, C/F. Objective:  Patient Vitals for the past 24 hrs:   BP Temp Temp src Pulse Resp SpO2 Weight   09/16/22 0322 125/62 97.9 °F (36.6 °C) Oral 82 19 97 % 114 lb 3.2 oz (51.8 kg)   09/15/22 1927 (!) 124/57 97.8 °F (36.6 °C) Oral 78 16 98 % --   09/15/22 1545 136/63 97.8 °F (36.6 °C) Oral 77 18 98 % --   09/15/22 0858 -- -- -- -- -- 94 % --   09/15/22 0753 (!) 105/57 98.9 °F (37.2 °C) Oral 90 17 92 % --       Gen: No distress, pleasant. HEENT: Normocephalic, atraumatic. CV: Regular rate and rhythm. Resp: No respiratory distress. Abd: Soft, nontender   Ext: No edema. Neuro: Alert, oriented, appropriately interactive.      Wt Readings from Last 3 Encounters:   09/16/22 114 lb 3.2 oz (51.8 kg)       Laboratory data:   Lab Results   Component Value Date    WBC 5.6 09/14/2022    HGB 13.5 09/14/2022    HCT 39.7 09/14/2022    MCV 92.9 09/14/2022     09/14/2022       Lab Results   Component Value Date/Time     09/14/2022 08:05 AM    K 4.6 09/14/2022 08:05 AM     09/14/2022 08:05 AM    CO2 26 09/14/2022 08:05 AM    BUN 23 09/14/2022 08:05 AM    CREATININE 0.7 09/14/2022 08:05 AM    GLUCOSE 108 09/14/2022 08:05 AM    CALCIUM 9.5 09/14/2022 08:05 AM        Therapy progress:  PT  Position Activity Restriction  Other position/activity restrictions: abdominal incision  Objective     Sit to Stand: Supervision  Stand to sit: Supervision  Bed to Chair: Supervision  Device: [employer]  Assistance: Stand by assistance  Distance: 200' with 2 turns, [de-identified]' with 4 turns on uneven surface (carpet)  OT  PT Equipment Recommendations  Equipment Needed: No  Toilet - Technique: Ambulating  Equipment Used: Grab bars (comfort height commode)             Body mass index is 20.23 kg/m². Assessment and Plan:     Small Bowel Obstruction - tramadol     Hypertension-monitor     Hypothyroid- synthroid     Asthma -Flonase     glaucoma -Xalatan     Bowels: Schedule Miralax + Senna S. Follow bowel movements. Enema or suppository if needed. Bladder: Check PVR x 3. [facility] if PVR > 200ml or if any volume is > 500 ml. Pain:  Tramadol is ordered prn.          Saint Spates, MD 9/16/2022, 7:13 AM

## 2022-09-17 PROCEDURE — 6370000000 HC RX 637 (ALT 250 FOR IP): Performed by: PHYSICAL MEDICINE & REHABILITATION

## 2022-09-17 PROCEDURE — 94760 N-INVAS EAR/PLS OXIMETRY 1: CPT

## 2022-09-17 PROCEDURE — 1280000000 HC REHAB R&B

## 2022-09-17 PROCEDURE — 6360000002 HC RX W HCPCS: Performed by: PHYSICAL MEDICINE & REHABILITATION

## 2022-09-17 RX ADMIN — FLUTICASONE PROPIONATE 1 SPRAY: 50 SPRAY, METERED NASAL at 08:11

## 2022-09-17 RX ADMIN — HEPARIN SODIUM 5000 UNITS: 5000 INJECTION INTRAVENOUS; SUBCUTANEOUS at 21:48

## 2022-09-17 RX ADMIN — HEPARIN SODIUM 5000 UNITS: 5000 INJECTION INTRAVENOUS; SUBCUTANEOUS at 14:53

## 2022-09-17 RX ADMIN — Medication 1 TABLET: at 08:10

## 2022-09-17 RX ADMIN — Medication 1 TABLET: at 21:48

## 2022-09-17 RX ADMIN — SENNOSIDES AND DOCUSATE SODIUM 2 TABLET: 50; 8.6 TABLET ORAL at 21:49

## 2022-09-17 RX ADMIN — HEPARIN SODIUM 5000 UNITS: 5000 INJECTION INTRAVENOUS; SUBCUTANEOUS at 05:36

## 2022-09-17 RX ADMIN — LATANOPROST 1 DROP: 50 SOLUTION/ DROPS OPHTHALMIC at 21:49

## 2022-09-17 RX ADMIN — SENNOSIDES AND DOCUSATE SODIUM 2 TABLET: 50; 8.6 TABLET ORAL at 08:10

## 2022-09-17 RX ADMIN — TAMSULOSIN HYDROCHLORIDE 0.4 MG: 0.4 CAPSULE ORAL at 21:48

## 2022-09-17 RX ADMIN — LEVOTHYROXINE SODIUM 100 MCG: 0.1 TABLET ORAL at 05:36

## 2022-09-17 RX ADMIN — THERA TABS 1 TABLET: TAB at 08:10

## 2022-09-17 ASSESSMENT — PAIN SCALES - GENERAL: PAINLEVEL_OUTOF10: 0

## 2022-09-17 NOTE — PLAN OF CARE
Problem: Discharge Planning  Goal: Discharge to home or other facility with appropriate resources  9/17/2022 1123 by Marianne Teixeira RN  Outcome: Progressing  Flowsheets (Taken 9/17/2022 0809)  Discharge to home or other facility with appropriate resources:   Identify barriers to discharge with patient and caregiver   Refer to discharge planning if patient needs post-hospital services based on physician order or complex needs related to functional status, cognitive ability or social support system  9/17/2022 0206 by Shena Valerio RN  Outcome: Progressing     Problem: Skin/Tissue Integrity  Goal: Absence of new skin breakdown  Description: 1. Monitor for areas of redness and/or skin breakdown  2. Assess vascular access sites hourly  3. Every 4-6 hours minimum:  Change oxygen saturation probe site  4. Every 4-6 hours:  If on nasal continuous positive airway pressure, respiratory therapy assess nares and determine need for appliance change or resting period.   9/17/2022 1123 by Marianne Teixeira RN  Outcome: Progressing  9/17/2022 0206 by Shena Valerio RN  Outcome: Progressing     Problem: Safety - Adult  Goal: Free from fall injury  9/17/2022 1123 by Marianne Teixeira RN  Outcome: Progressing  9/17/2022 0206 by Shena Valerio RN  Outcome: Progressing     Problem: ABCDS Injury Assessment  Goal: Absence of physical injury  9/17/2022 1123 by Marianne Teixeira RN  Outcome: Progressing  9/17/2022 0206 by Shena Valerio RN  Outcome: Progressing

## 2022-09-17 NOTE — PROGRESS NOTES
This is a 80 y.o.  female admitted on 9/13/2022 with SBO (small bowel obstruction) ([address] [K56.609]. Alert and oriented x 4. No sign of distress. She is a full code. Has been incontinent of bladder this shift. Depends on. On heparin for DVT prophylaxis. Transfers with a cane x 1. On a regular diet. Takes her pills whole with thins. On room air. Surgical incision to the abdomen, open to air. clean, dry, and intact with dry dressing. Thierry hose removed . HS medication given. Tolerated well. Education provided . Call light  and bedside table within reach. Patient instructed to call if there is any needs or changes.

## 2022-09-17 NOTE — PLAN OF CARE
Problem: Discharge Planning  Goal: Discharge to home or other facility with appropriate resources  9/17/2022 0206 by Chanel Winn RN  Outcome: Progressing     Problem: Skin/Tissue Integrity  Goal: Absence of new skin breakdown  Description: 1. Monitor for areas of redness and/or skin breakdown  2. Assess vascular access sites hourly  3. Every 4-6 hours minimum:  Change oxygen saturation probe site  4. Every 4-6 hours:  If on nasal continuous positive airway pressure, respiratory therapy assess nares and determine need for appliance change or resting period.   9/17/2022 0206 by Chanel Winn RN  Outcome: Progressing     Problem: Safety - Adult  Goal: Free from fall injury  9/17/2022 0206 by Chanel Winn RN  Outcome: Progressing     Problem: ABCDS Injury Assessment  Goal: Absence of physical injury  9/17/2022 0206 by Chanel Winn RN  Outcome: Progressing

## 2022-09-17 NOTE — PROGRESS NOTES
Patient admitted to rehab with small bowel obstruction. A/Ox4. Transfers with no device, ambulates with cane. Mobility restrictions: WBAT. On regular diet, tolerating well. Medications taken whole. On heparin for DVT prophylaxis. Skin: heels and coccyx reddened. Oxygen: room air. LDA: none. Has been continent of bowel and incontinent of bladder. LBM 9/16. Chair/bed alarms in use and call light in reach. Will monitor for safety.  Electronically signed by Amol Dickey RN, [address] [place] [address] on 9/17/22 at 9:57 AM EDT

## 2022-09-18 PROCEDURE — 94760 N-INVAS EAR/PLS OXIMETRY 1: CPT

## 2022-09-18 PROCEDURE — 6360000002 HC RX W HCPCS: Performed by: PHYSICAL MEDICINE & REHABILITATION

## 2022-09-18 PROCEDURE — 1280000000 HC REHAB R&B

## 2022-09-18 PROCEDURE — 6370000000 HC RX 637 (ALT 250 FOR IP): Performed by: PHYSICAL MEDICINE & REHABILITATION

## 2022-09-18 RX ADMIN — FLUTICASONE PROPIONATE 1 SPRAY: 50 SPRAY, METERED NASAL at 07:44

## 2022-09-18 RX ADMIN — THERA TABS 1 TABLET: TAB at 07:43

## 2022-09-18 RX ADMIN — HEPARIN SODIUM 5000 UNITS: 5000 INJECTION INTRAVENOUS; SUBCUTANEOUS at 13:39

## 2022-09-18 RX ADMIN — HEPARIN SODIUM 5000 UNITS: 5000 INJECTION INTRAVENOUS; SUBCUTANEOUS at 05:43

## 2022-09-18 RX ADMIN — SENNOSIDES AND DOCUSATE SODIUM 2 TABLET: 50; 8.6 TABLET ORAL at 21:45

## 2022-09-18 RX ADMIN — Medication 1 TABLET: at 07:43

## 2022-09-18 RX ADMIN — Medication 1 TABLET: at 21:45

## 2022-09-18 RX ADMIN — TAMSULOSIN HYDROCHLORIDE 0.4 MG: 0.4 CAPSULE ORAL at 21:45

## 2022-09-18 RX ADMIN — HEPARIN SODIUM 5000 UNITS: 5000 INJECTION INTRAVENOUS; SUBCUTANEOUS at 21:44

## 2022-09-18 RX ADMIN — LEVOTHYROXINE SODIUM 100 MCG: 0.1 TABLET ORAL at 05:43

## 2022-09-18 RX ADMIN — LATANOPROST 1 DROP: 50 SOLUTION/ DROPS OPHTHALMIC at 21:51

## 2022-09-18 ASSESSMENT — PAIN SCALES - GENERAL: PAINLEVEL_OUTOF10: 0

## 2022-09-18 NOTE — PLAN OF CARE
Problem: Discharge Planning  Goal: Discharge to home or other facility with appropriate resources  9/18/2022 0859 by Balwinder Howell RN  Outcome: Progressing  9/17/2022 2352 by Americo Romero RN  Outcome: Progressing     Problem: Skin/Tissue Integrity  Goal: Absence of new skin breakdown  Description: 1. Monitor for areas of redness and/or skin breakdown  2. Assess vascular access sites hourly  3. Every 4-6 hours minimum:  Change oxygen saturation probe site  4. Every 4-6 hours:  If on nasal continuous positive airway pressure, respiratory therapy assess nares and determine need for appliance change or resting period. 9/18/2022 0859 by Balwinder Howell RN  Outcome: Progressing  Note: Skin assessment completed on admission and every shift. Barrier wipes used in the event of incontinence. Pressure relief techniques used as needed while in chair and in bed.  Position changes encouraged at least every two hours while in bed.     9/17/2022 2352 by Americo Romero RN  Outcome: Progressing     Problem: Safety - Adult  Goal: Free from fall injury  9/18/2022 0859 by Balwinder Howell RN  Outcome: Progressing  9/17/2022 2352 by Americo Romero RN  Outcome: Progressing     Problem: ABCDS Injury Assessment  Goal: Absence of physical injury  9/18/2022 0859 by Balwinder Howell RN  Outcome: Progressing  9/17/2022 2352 by Americo Romero RN  Outcome: Progressing

## 2022-09-18 NOTE — PROGRESS NOTES
This is a 80 y.o.  female admitted on 9/13/2022 with [facility] [K56.609]. Alert and oriented x 4. No sign of distress. She is a full code. Has been continent of bladder this shift. Depends on. On heparin for DVT prophylaxis. Transfers with a cane x 1. On a regular diet. Takes her pills whole with thins. On room air. Surgical incision to the abdomen, open to air. Thierry hose removed . HS medication given. Tolerated well. Education provided . Call light  and bedside table within reach. Patient instructed to call if there is any needs or changes.

## 2022-09-18 NOTE — PROGRESS NOTES
Patient admitted to rehab with small bowel obstruction. A/Ox4. Transfers with no device, ambulates with cane. Mobility restrictions: WBAT. On regular  diet, tolerating well. Medications taken whole. On heparin for DVT prophylaxis. Skin: surgical incision to abdomen remains intact, no drainage noted. Oxygen: room air. LDA: none. Has been continent of bowel and incontinent of bladder. LBM 9/18/22. Chair/bed alarms in use and call light in reach. Will monitor for safety.  Electronically signed by Ash Mercedes RN, [address] [place] [address] on 9/18/22 at 9:02 AM EDT

## 2022-09-18 NOTE — PLAN OF CARE
Problem: Discharge Planning  Goal: Discharge to home or other facility with appropriate resources  9/17/2022 2352 by Rony Weldon RN  Outcome: Progressing     Problem: Skin/Tissue Integrity  Goal: Absence of new skin breakdown  Description: 1. Monitor for areas of redness and/or skin breakdown  2. Assess vascular access sites hourly  3. Every 4-6 hours minimum:  Change oxygen saturation probe site  4. Every 4-6 hours:  If on nasal continuous positive airway pressure, respiratory therapy assess nares and determine need for appliance change or resting period.   9/17/2022 2352 by Rony Weldon RN  Outcome: Progressing     Problem: Safety - Adult  Goal: Free from fall injury  9/17/2022 2352 by Rony Weldon RN  Outcome: Progressing     Problem: ABCDS Injury Assessment  Goal: Absence of physical injury  9/17/2022 2352 by Rony Weldon RN  Outcome: Progressing

## 2022-09-18 NOTE — PROGRESS NOTES
Hypothyroid- synthroid     Asthma -Flonase     glaucoma -Xalatan     Bowels: Schedule Miralax + Senna S. Follow bowel movements. Enema or suppository if needed. Bladder: Check PVR x 3. [address] if PVR > 200ml or if any volume is > 500 ml. Pain:  Tramadol is ordered prn.          Carol Ann Liz MD  9/18/2022, 11:19 AM

## 2022-09-19 PROCEDURE — 97116 GAIT TRAINING THERAPY: CPT

## 2022-09-19 PROCEDURE — 6360000002 HC RX W HCPCS: Performed by: PHYSICAL MEDICINE & REHABILITATION

## 2022-09-19 PROCEDURE — 94760 N-INVAS EAR/PLS OXIMETRY 1: CPT

## 2022-09-19 PROCEDURE — 97110 THERAPEUTIC EXERCISES: CPT

## 2022-09-19 PROCEDURE — 97530 THERAPEUTIC ACTIVITIES: CPT

## 2022-09-19 PROCEDURE — 6370000000 HC RX 637 (ALT 250 FOR IP): Performed by: PHYSICAL MEDICINE & REHABILITATION

## 2022-09-19 PROCEDURE — 97535 SELF CARE MNGMENT TRAINING: CPT

## 2022-09-19 PROCEDURE — 1280000000 HC REHAB R&B

## 2022-09-19 PROCEDURE — 97112 NEUROMUSCULAR REEDUCATION: CPT

## 2022-09-19 RX ADMIN — LEVOTHYROXINE SODIUM 100 MCG: 0.1 TABLET ORAL at 05:42

## 2022-09-19 RX ADMIN — Medication 1 TABLET: at 20:30

## 2022-09-19 RX ADMIN — Medication 1 TABLET: at 08:10

## 2022-09-19 RX ADMIN — HEPARIN SODIUM 5000 UNITS: 5000 INJECTION INTRAVENOUS; SUBCUTANEOUS at 14:49

## 2022-09-19 RX ADMIN — LATANOPROST 1 DROP: 50 SOLUTION/ DROPS OPHTHALMIC at 22:21

## 2022-09-19 RX ADMIN — HEPARIN SODIUM 5000 UNITS: 5000 INJECTION INTRAVENOUS; SUBCUTANEOUS at 20:30

## 2022-09-19 RX ADMIN — SENNOSIDES AND DOCUSATE SODIUM 2 TABLET: 50; 8.6 TABLET ORAL at 20:29

## 2022-09-19 RX ADMIN — FLUTICASONE PROPIONATE 1 SPRAY: 50 SPRAY, METERED NASAL at 08:09

## 2022-09-19 RX ADMIN — THERA TABS 1 TABLET: TAB at 08:10

## 2022-09-19 RX ADMIN — TAMSULOSIN HYDROCHLORIDE 0.4 MG: 0.4 CAPSULE ORAL at 20:30

## 2022-09-19 RX ADMIN — HEPARIN SODIUM 5000 UNITS: 5000 INJECTION INTRAVENOUS; SUBCUTANEOUS at 05:42

## 2022-09-19 RX ADMIN — POLYETHYLENE GLYCOL 3350 17 G: 17 POWDER, FOR SOLUTION ORAL at 08:10

## 2022-09-19 RX ADMIN — SENNOSIDES AND DOCUSATE SODIUM 2 TABLET: 50; 8.6 TABLET ORAL at 08:10

## 2022-09-19 NOTE — PROGRESS NOTES
Nilam Berg  9/19/2022  [id number]    Chief Complaint: SBO (small bowel obstruction) ([address]    Subjective: Patient seen this AM.  Patient did well over the weekend, with no problems noted. Patient seen walking around in her room this morning with OT using just a cane displaying good balance. Her abdominal incision appears to be healing well, and she does not have any abdominal pain. We should be able to discharge her to home this week. Patient will be discussed in rehab conference tomorrow with the entire rehab team.  We will recheck her labs tomorrow morning. ROS: No N/C, SOB, chest pain, C/F. Objective:  Patient Vitals for the past 24 hrs:   BP Temp Temp src Pulse Resp SpO2 Weight   09/19/22 0423 120/68 97.9 °F (36.6 °C) Oral 78 18 92 % 120 lb 9.5 oz (54.7 kg)   09/18/22 1947 (!) 120/57 97.4 °F (36.3 °C) Oral 75 16 96 % --   09/18/22 1614 (!) 94/52 97.5 °F (36.4 °C) Oral 93 17 94 % --       Gen: No distress, pleasant. HEENT: Normocephalic, atraumatic. CV: Regular rate and rhythm. Resp: No respiratory distress. Abd: Soft, nontender   Ext: No edema. Neuro: Alert, oriented, appropriately interactive.      Wt Readings from Last 3 Encounters:   09/19/22 120 lb 9.5 oz (54.7 kg)       Laboratory data:   Lab Results   Component Value Date    WBC 5.6 09/14/2022    HGB 13.5 09/14/2022    HCT 39.7 09/14/2022    MCV 92.9 09/14/2022     09/14/2022       Lab Results   Component Value Date/Time     09/14/2022 08:05 AM    K 4.6 09/14/2022 08:05 AM     09/14/2022 08:05 AM    CO2 26 09/14/2022 08:05 AM    BUN 23 09/14/2022 08:05 AM    CREATININE 0.7 09/14/2022 08:05 AM    GLUCOSE 108 09/14/2022 08:05 AM    CALCIUM 9.5 09/14/2022 08:05 AM        Therapy progress:  PT  Position Activity Restriction  Other position/activity restrictions: abdominal incision  Objective     Sit to Stand: Supervision  Stand to sit: Supervision  Bed to Chair: Supervision  Device: [address]: Stand by assistance  Distance: 200' with 2 turns, [de-identified]' with 4 turns on uneven surface (carpet)  OT  PT Equipment Recommendations  Equipment Needed: No  Toilet - Technique: Ambulating  Equipment Used: Grab bars (comfort height commode)             Body mass index is 21.36 kg/m². Assessment and Plan:     Small Bowel Obstruction - tramadol     Hypertension-monitor     Hypothyroid- synthroid     Asthma -Flonase     glaucoma -Xalatan     Bowels: Schedule Miralax + Senna S. Follow bowel movements. Enema or suppository if needed. Bladder: Check PVR x 3. [address] if PVR > 200ml or if any volume is > 500 ml. Pain:  Tramadol is ordered prn.          Dru Lal MD 9/19/2022, 8:45 AM

## 2022-09-19 NOTE — PATIENT CARE CONFERENCE
[facility]  Inpatient Rehabilitation  Weekly Team Conference Note      Date: 2022  Patient Name:  Mega Ayers    MRN: [id number]  : 1/10/1928  Gender:   Physician:   Diagnosis: SBO (small bowel obstruction) ([facility] [U61.647]    CASE MANAGEMENT  Assessment: Goal is home; agreeable to home care services. PHYSICAL THERAPY    Bed Mobility:  Overall Assistance Level: Modified Independent  Additional Factors: Increased time to complete  Sit>supine:  Assistance Level: Modified independent  Skilled Clinical Factors: increased time to complete  Supine>sit:  Assistance Level: Modified independent  Skilled Clinical Factors: pt swings trunk up and uses momentum to help complete this transfer. As they are swinging trunk up they move their legs out to the R side. Transfers:  Surface: From chair with arms  Device: Cane  Sit>stand:  Assistance Level: Modified independent  Skilled Clinical Factors: Pt able to complete with increased time. When going from sit to stand pt will hold onto arm rests of WC for about 5 seconds gaining balance in standing prior to ambulating. Stand>sit:  Assistance Level: Modified independent  Skilled Clinical Factors: pt required increased time to complete. Pt was able to remember to reach back with UE for the hand rails in order to safely complete transfer  Bed<>chair  Technique: Stand step  Assistance Level: Modified independent  Skilled Clinical Factors: increased time to complete       Car transfer:  Assistance Level: Modified independent  Skilled Clinical Factors: Pt able to complete however when sitting down they used the car door to help sit down and then they were not able to slowly control their descent from standing to sitting and completed the transfer very quickly. Pt able to lift their LE into the car but had to use their UE in order to complete.  When exiting car pt went to go use the car door however corrected and put their hands on the car chair in order to the curb step descent to put cane on the ground and then step down. Pt completed descent of curbstep with CGA. Performed another curb step where SPT provided additional verbal cues for the ascent of the steps to tell patient to put cane up onto curb step before bring up the LLE. This was still difficult for the patient and they still required min A and used a vaulting strategy to bring LLE up onto curb. The descent was the same the second time where the pt required CGA and verbal cues in order to complete. Assessment:  Assessment: Pt completed all ambulation today with SPC and supervision during ambulation pt has in-toeing on both LE and had some shuffling of their gait that became more pronounced as they became fatigued. Pt was mod I for their bed mobility, CGA-min A for the curb step, mod I for the steps and mod I for completing the car transfer. Pt has some decreased endurance, strength and functional mobility and continues to be below their PLOF. Pt will benefit form continued therapy for strengthening, balance, and safety before returning to their prior living situation. Activity Tolerance: Patient tolerated treatment well  Discharge Recommendations: Home with assist PRN, Home with Home health PT       SPEECH THERAPY (intentionally left blank if not actively being seen by this service):      OCCUPATIONAL THERAPY  ADLs:  Feeding  Assistance Level: Set-up  Skilled Clinical Factors: assisted to open mustard package, fed self indep after set up  Grooming/Oral Hygiene  Assistance Level: Supervision  Skilled Clinical Factors: declined oral care, stated she would complete hair after she ate when it was dry  UE Bathing  Assistance Level: Set-up  Skilled Clinical Factors: covered abd incision/staples  LE Bathing  Assistance Level: Modified independent  Skilled Clinical Factors: long sponge for LE to bathe, towel /reacher to dry.   Stood with grab bar to bathe buttocks/db area  UE Dressing  Assistance Level: Modified independent  Skilled Clinical Factors: camisole, shirt  LE Dressing  Assistance Level: Modified independent  Skilled Clinical Factors: reacher to thread briefs and pants over feet, over hips using grab bar for balance, indep  Putting On/Taking Off Footwear  Equipment Provided: Reachers  Assistance Level: Verbal cues, Set-up  Skilled Clinical Factors: elastic shoe laces, long shoe horn, leans forward to adjust tongue of shoe, indep  Toileting  Assistance Level: Modified independent  Skilled Clinical Factors: urinated on commode, pants down/up indep    Transfers: Toilet Transfers  Equipment: Grab bars  Additional Factors: With handrails  Assistance Level: Modified independent  Tub/Shower Transfers  Type: Shower  Transfer To: Shower chair with back  Assistance Level: Stand by assist  Skilled Clinical Factors: grab bar    IADLs:       Light Housekeeping  Light Housekeeping Level: Nora Mendoza Housekeeping Level of Assistance: Stand by assistance  Light Housekeeping: pt able to gather laundry from dryer, cues to use top of dryer for support vs cane. Amb with cane with SBA to/from therapy gym, min cues for pathfinding. anticipate pt will be able to complete her laundry at discharge         UE function:  Pain in shoulders, tolerates 1 lb dumbbell for exercises. Uses functionally for ADL , some difficulty with hands and shoulders due to arthritis    Assessment:  Assessment: Pt completed shower indep after set up, dressed UB indep, LB with assist for ISHAN hose. Transfer and functional mobility with supervision/SBA using st cane. She has improved with activity tolerance, anticipate pt will be ready for discharge this week - will discuss in conference.   No further OT is recommended at discharge, no further DME is required  Activity Tolerance: Patient tolerated treatment well  Discharge Recommendations: Home with assist PRN      NUTRITION  Most recent weightWeight: 120 lb 9.5 oz (54.7 kg)  BMI (Calculated): 21.4  Diet Order: ADULT DIET; Regular  Meal Intake: %  Please see nutrition note for details. NURSING  Pt can be incontinent of bladder at times, continent of bowel; incision care, monitor and maintain skin integrity. Family Education: Patient Education:Medications, incision care, skin care and prevention, diet, pain control as needed, safety and fall prevention. MEDICAL      TEAM SUMMARY AND DISCHARGE PLAN  Estimated Length of ITVU:4/70/7853  Destination: home health  Anticipated Services at Discharge:    [] OT  [x] PT   [] SLP    [x] RN   [] Home Health aide []   Community Resources: _______________________________  Equipment recommendations:  [] Hospital bed [] Tub bench  [] Shower chair [] Hand held shower  [] Raised toilet seat [] Toilet safety frame [] Bedside commode   [] W/C: _____  [] Rolling Walker [] Standard walker [] Gait belt [] cane: _________  [] Sliding board [] Alternate seating/furniture [] O2 [] Hip Kit: _______  [] Life Line [] Other: _______  Factors facilitating achievement of predicted outcomes: Cooperative  Barriers to the achievement of predicted outcomes/Interventions: activity tolerance,        Interdisciplinary Individualized Plan of Care Review:    Continue Current Plan of Care: Yes    Modifications:_____________________________    Special Needs in the Upcoming Week :    [] Family/Caregiver Education  [] Home visit  []Therapeutic Pass   [] Consults:_______    [] Other;_______    Patient Rehab Team Goals for the Upcoming Week:  1. Ambulate community level distances with SPC mod I.  2. Self care indep except ISHAN hose  3.            Team Members Present at Conference:  Physician:Dr. Jackson THOMPSON  :  Romi Dumont [facility]   Occupational Therapist: Lela Hutton, OTR/L [address] [facility], [id number]  Speech Therapist:   Nurse:ALICIA Supervisor: Annia Martin RN, CRRN  Dietitian: Denice Chahal RD, LD  Psychologist:  Other:      I led this team conference and I approve the established interdisciplinary plan of care as documented within the medical record of Valerio Herbert.     MD: Dr. Alonzo Asencio

## 2022-09-19 NOTE — PROGRESS NOTES
This is a 80 y.o.  female admitted on 9/13/2022 with SBO [facility]small [facility] [K56.609]. Alert and oriented x 4. No sign of distress. She is a full code. Has been continent of  bowel and bladder this shift. LBM : 9/19/22 Depends on. On heparin for DVT prophylaxis. Transfers with a cane x 1. On a regular diet. Takes her pills whole with thins. On room air. Surgical incision to the abdomen, open to air. Thierry hose removed  washed. HS medication given. Tolerated well. Education provided . Call light  and bedside table within reach. Patient instructed to call if there is any needs or changes.

## 2022-09-19 NOTE — PROGRESS NOTES
PHYSICAL THERAPY  Progress Note   Second Session    Patient Name: Li Gould  Medical Record Number: [id number]           Chart Reviewed: Yes   Restrictions/Precautions: Fall Risk Other position/activity restrictions: abdominal incision   Additional Pertinent Hx: Per Dr. Ambreen Patel H&P, \"Patient is admitted from [address] [place] [address] after treatment for small bowel obstruction. She is a 22-year-old female with a known history of hypertension, asthma, glaucoma, who presented on 9/5 to [facility] with high-grade mechanical small bowel obstruction. Patient was taken to surgery the same day for exploratory laparotomy and repair of abdominal hernia causing obstruction of her small bowel. Postop NG tube remains in place, this was removed as her diet was slowly advanced, and now she is tolerating a regular diet. Patient also placed back on Synthroid for her hypothyroidism. The time of discharge, patient is tolerating oral food and hydration, was ambulating with assistance after she started therapies. Patient needs more therapy before discharge to home safely. \"        Subjective: Pt reports no pain at this time. Objective    Pt completed 6 MWT with cane support, able to ambulate 720' with supervision. She took seated rest, then practiced sit < > stand x 5 with (B) UE support, x 5 with single hand support, cues to stand as fast as possible, and keep weight forward. Pt able to complete with SBA. Pt then practiced ambulating 8' from one chair to another, turning, aligning herself with the chair, and sitting. With cues to align RLE near R side of chair, and to intentionally look for arm-rest support, pt able to complete this activity with slightly improved speed, supervision. Pt practiced unsupported static stand EC 2 x 10 s., SBA. Pt able to complete unsupported lateral weight shift x 10 EO, x 10 EC, CGA-SBA. Pt repeated with A/P weight shift x 10 EO, x 10 EC, requiring intermittent Min A d/t decreased trunk control.   Pt

## 2022-09-19 NOTE — PROGRESS NOTES
Occupational Therapy  Facility/Department: [facility]  Rehabilitation Occupational Therapy Daily Treatment Note    Date: 22  Patient Name: Christin Duong       Room: M9S-1197/1816-99  MRN: [id number]  Account: [de-identified]   : 1/10/1928  (80 y.o.) Gender: female                    Past Medical History:  has no past medical history on file. Past Surgical History:   has no past surgical history on file. Restrictions  Restrictions/Precautions: Fall Risk  Other position/activity restrictions: abdominal incision    Subjective  Subjective: pt met bedside, requested toileting, agreeable to shower with OT  Restrictions/Precautions: Fall Risk             Objective     Cognition  Overall Cognitive Status: Exceptions  Memory: Decreased short term memory  Cognition Comment: min slow to answer/process at times, complicated by [facility] Some difficulty remembering therapy activities from day to day  Orientation  Overall Orientation Status: Within Functional Limits  Orientation Level: Oriented X4 (BIMs 15/15)         ADL  Feeding  Assistance Level: Set-up  Skilled Clinical Factors: assisted to open mustard package, fed self indep after set up  Grooming/Oral Hygiene  Skilled Clinical Factors: declined oral care, stated she would complete hair after she ate when it was dry  Upper Extremity Bathing  Assistance Level: Set-up  Skilled Clinical Factors: covered abd incision/staples  Lower Extremity Bathing  Assistance Level: Modified independent  Skilled Clinical Factors: long sponge for LE to bathe, towel /reacher to dry.   Stood with grab bar to bathe buttocks/db area  Upper Extremity Dressing  Assistance Level: Modified independent  Skilled Clinical Factors: camisole, shirt  Lower Extremity Dressing  Assistance Level: Modified independent  Skilled Clinical Factors: reacher to thread briefs and pants over feet, over hips using grab bar for balance, indep  Putting On/Taking Off Footwear  Equipment Provided: Reachers  Skilled Clinical Factors: elastic shoe laces, long shoe horn, leans forward to adjust tongue of shoe, indep  Toileting  Assistance Level: Modified independent  Skilled Clinical Factors: urinated on commode, pants down/up indep  Toilet Transfers  Equipment: Grab bars  Additional Factors: With handrails  Assistance Level: Modified independent  Tub/Shower Transfers  Type: Shower  Transfer To: Shower chair with back  Assistance Level: Stand by assist  Skilled Clinical Factors: grab bar          Functional Mobility  Device: Cane  Activity: To/From bathroom; Retrieve items;Transport items  Assistance Level: Supervision  Transfers  Surface: To chair with arms;From chair with arms  Device: Cane  Sit to Stand  Assistance Level: Supervision  Stand to Sit  Assistance Level: Supervision         PM session  Pt met in dept, agreeable to OT. Stated she was waiting back from a call from  regarding staple removal, appt with MILA SURESH ex - completed 1 lb dumbbell to improve activity tolerance for ADL, mobility. Shoulder flex/ext, horizontal abd/add, elbow flex/ext, sup/pron, wrist flex/ext - 10 reps each, pt able to count repetitions per self. Pt amb to activity room, ADL apt, then returned to gym with cues for pathfinding. She only required SBA for mobility using st cane (hurrycane). Transfer to low chair with arms, wheelchair, recliner with supervision/indep  Able to reach into low drawer, no LOB when leaning low. Returned to room, able to find her room indep. Transfer to commode, managed clothing down/up indep, hygiene after urination indep. Amb to sink indep with st cane, washed hands at sink, then to recliner indep/supervision  Chair alarm in place, call light, phone in reach    Assessment  Assessment  Assessment: Pt completed shower indep after set up, dressed UB indep, LB with assist for ISHAN hose. Transfer and functional mobility with supervision/SBA using st cane.   She has improved with activity tolerance, anticipate pt will be ready for discharge this week - will discuss in conference. No further OT is recommended at discharge, no further DME is required  Activity Tolerance: Patient tolerated treatment well  Discharge Recommendations: Home with assist PRN  OT Equipment Recommendations  Equipment Needed: No  Other: most likely has all needed equipment  Safety Devices  Safety Devices in place: Yes  Type of devices: Chair alarm in place; Left in chair;Call light within reach;Gait belt    Patient Education  Education  Education Given To: Patient  Education Provided: Mobility Training;Transfer Training  Education Provided Comments: ISHAN hose  Education Method: Demonstration;Verbal;Teach Back  Education Outcome: Verbalized understanding;Demonstrated understanding;Continued education needed    Plan  Plan  Times per Week: 5-6  Times per Day: Twice a day  Plan Weeks: 1 week  Current Treatment Recommendations: Strengthening;Balance training;Functional mobility training; Endurance training; Safety education & training;Patient/Caregiver education & training;Equipment evaluation, education, & procurement;Self-Care / ADL    Goals  Patient Goals   Patient goals : I want to get stronger and be able to take care of myself again  Short Term Goals  Time Frame for Short term goals: 1 week pt will. .  Short Term Goal 1: bathe indep with shower chair, grab bars Goal not met, requires set up to cover incision  Short Term Goal 2: dress UB and LB indep except ISHAN hose if she needs to continue to wear  Goal MEt  Short Term Goal 3: toilet indep with grab bars  Goal Met  Short Term Goal 4: transfer indep with LRAD  Short Term Goal 5: functional mobility  and simple meal prep indep with LRAD  Short Term Goal 6: improve activity tolerance to stand 8 min for ADL/IADL tasks  Long Term Goals  Time Frame for Long term goals : same as stg                   Therapy Time   Individual Concurrent Group Co-treatment   Time In 1045         Time Out 7649 Minutes 60         Timed Code Treatment Minutes: 60 Minutes   Therapy Time     Individual Co-treatment   Time In [id number]     Time Out 1430     Minutes 939 Vita Solis, OTR/L 770

## 2022-09-19 NOTE — PROGRESS NOTES
Patient admitted to rehab with small bowel obstruction. A/Ox4. Transfers with no device, ambulates with cane. Mobility restrictions: WBAT. On regular  diet, tolerating well. Medications taken whole. On heparin for DVT prophylaxis. Skin: surgical incision to abdomen remains intact, no drainage noted. Oxygen: room air. LDA: none. Has been continent of bowel and incontinent of bladder. LBM 9/19/22. Chair/bed alarms in use and call light in reach. Will monitor for safety.  Electronically signed by Elaine Aguayo RN on 9/19/2022 at 8:35 AM

## 2022-09-19 NOTE — PLAN OF CARE
Problem: Discharge Planning  Goal: Discharge to home or other facility with appropriate resources  9/19/2022 0832 by Jayy Costa RN  Outcome: Progressing  9/19/2022 0027 by Nadia Grace RN  Outcome: Progressing     Problem: Skin/Tissue Integrity  Goal: Absence of new skin breakdown  Description: 1. Monitor for areas of redness and/or skin breakdown  2. Assess vascular access sites hourly  3. Every 4-6 hours minimum:  Change oxygen saturation probe site  4. Every 4-6 hours:  If on nasal continuous positive airway pressure, respiratory therapy assess nares and determine need for appliance change or resting period.   9/19/2022 0832 by Jayy Costa RN  Outcome: Progressing  9/19/2022 0027 by Nadia Grace RN  Outcome: Progressing     Problem: Safety - Adult  Goal: Free from fall injury  9/19/2022 0832 by Jayy Costa RN  Outcome: Progressing  9/19/2022 0027 by Nadia Grace RN  Outcome: Progressing     Problem: ABCDS Injury Assessment  Goal: Absence of physical injury  9/19/2022 0832 by Jayy Costa RN  Outcome: Progressing  9/19/2022 0027 by Nadia Grace RN  Outcome: Progressing

## 2022-09-20 LAB
ANION GAP SERPL CALCULATED.3IONS-SCNC: 12 MMOL/L (ref 3–16)
BUN BLDV-MCNC: 25 MG/DL (ref 7–20)
CALCIUM SERPL-MCNC: 9.3 MG/DL (ref 8.3–10.6)
CHLORIDE BLD-SCNC: 104 MMOL/L (ref 99–110)
CO2: 26 MMOL/L (ref 21–32)
CREAT SERPL-MCNC: 0.7 MG/DL (ref 0.6–1.2)
GFR AFRICAN AMERICAN: >60
GFR NON-AFRICAN AMERICAN: >60
GLUCOSE BLD-MCNC: 91 MG/DL (ref 70–99)
HCT VFR BLD CALC: 30.9 % (ref 36–48)
HEMOGLOBIN: 10.4 G/DL (ref 12–16)
MAGNESIUM: 1.9 MG/DL (ref 1.8–2.4)
MCH RBC QN AUTO: 31.7 PG (ref 26–34)
MCHC RBC AUTO-ENTMCNC: 33.7 G/DL (ref 31–36)
MCV RBC AUTO: 94.1 FL (ref 80–100)
PDW BLD-RTO: 14.2 % (ref 12.4–15.4)
PLATELET # BLD: 287 K/UL (ref 135–450)
PMV BLD AUTO: 8.2 FL (ref 5–10.5)
POTASSIUM SERPL-SCNC: 4.3 MMOL/L (ref 3.5–5.1)
RBC # BLD: 3.28 M/UL (ref 4–5.2)
SODIUM BLD-SCNC: 142 MMOL/L (ref 136–145)
WBC # BLD: 6.1 K/UL (ref 4–11)

## 2022-09-20 PROCEDURE — 97530 THERAPEUTIC ACTIVITIES: CPT

## 2022-09-20 PROCEDURE — 97535 SELF CARE MNGMENT TRAINING: CPT

## 2022-09-20 PROCEDURE — 97116 GAIT TRAINING THERAPY: CPT

## 2022-09-20 PROCEDURE — 6370000000 HC RX 637 (ALT 250 FOR IP): Performed by: PHYSICAL MEDICINE & REHABILITATION

## 2022-09-20 PROCEDURE — 80048 BASIC METABOLIC PNL TOTAL CA: CPT

## 2022-09-20 PROCEDURE — 97110 THERAPEUTIC EXERCISES: CPT

## 2022-09-20 PROCEDURE — 83735 ASSAY OF MAGNESIUM: CPT

## 2022-09-20 PROCEDURE — 1280000000 HC REHAB R&B

## 2022-09-20 PROCEDURE — 6360000002 HC RX W HCPCS: Performed by: PHYSICAL MEDICINE & REHABILITATION

## 2022-09-20 PROCEDURE — 36415 COLL VENOUS BLD VENIPUNCTURE: CPT

## 2022-09-20 PROCEDURE — 85027 COMPLETE CBC AUTOMATED: CPT

## 2022-09-20 RX ADMIN — THERA TABS 1 TABLET: TAB at 09:49

## 2022-09-20 RX ADMIN — HEPARIN SODIUM 5000 UNITS: 5000 INJECTION INTRAVENOUS; SUBCUTANEOUS at 05:35

## 2022-09-20 RX ADMIN — Medication 1 TABLET: at 20:53

## 2022-09-20 RX ADMIN — HEPARIN SODIUM 5000 UNITS: 5000 INJECTION INTRAVENOUS; SUBCUTANEOUS at 13:30

## 2022-09-20 RX ADMIN — LATANOPROST 1 DROP: 50 SOLUTION/ DROPS OPHTHALMIC at 20:53

## 2022-09-20 RX ADMIN — LEVOTHYROXINE SODIUM 100 MCG: 0.1 TABLET ORAL at 05:35

## 2022-09-20 RX ADMIN — TAMSULOSIN HYDROCHLORIDE 0.4 MG: 0.4 CAPSULE ORAL at 20:53

## 2022-09-20 RX ADMIN — HEPARIN SODIUM 5000 UNITS: 5000 INJECTION INTRAVENOUS; SUBCUTANEOUS at 20:53

## 2022-09-20 RX ADMIN — Medication 1 TABLET: at 09:49

## 2022-09-20 RX ADMIN — FLUTICASONE PROPIONATE 1 SPRAY: 50 SPRAY, METERED NASAL at 12:38

## 2022-09-20 NOTE — PROGRESS NOTES
Patient admitted to rehab with debility s/p small bowel obstruction. A/Ox4. Transfers with cane x 1. Mobility restrictions: WBAT. On regular diet, tolerating well. Medications taken whole with thin liquids. On heparin, chayo hose for DVT prophylaxis. Skin: buttocks reddened - triad cream, heels soft, mepilex protectors applied, incision to abdomen GENE, no drainage noted. Oxygen: RA. LDA: none. Has been continent of bowel and incontinent at times of bladder. LBM 9/19. Chair/bed alarms in use and call light in reach. Will monitor for safety.

## 2022-09-20 NOTE — PROGRESS NOTES
push themselves into side lying. Roll Right  Assistance Level: Modified independent  Skilled Clinical Factors: increased time to complete. Pt used both UE and LE to help push themselves into side lying. Sit to Supine  Assistance Level: Modified independent  Skilled Clinical Factors: increased time to complete  Supine to Sit  Assistance Level: Modified independent  Skilled Clinical Factors: pt swings trunk up and uses momentum to help complete this transfer. As they are swinging trunk up they move their legs out to the R side. Scooting  Assistance Level: Modified independent  Skilled Clinical Factors: increased time to complete and is only able to scoot up about 2\". Balance  Sitting Balance: Modified independent   Standing Balance: Modified independent   Standing Balance  Time: 5 minutes  Activity: Pt in bathroom standing and doffing clothes, performed stand>sit with use of sink and reaching back for toilet seat. Patient able to balance on toilet seat and complete all pericare independently. Sit>stand was mod I with use of sink to help raise. Once standing pt was mod I for donning clothes. Standing at sink and washing hands mod I with increased time. Sit to Stand  Assistance Level: Modified independent  Skilled Clinical Factors: Pt able to complete with increased time. When going from sit to stand pt will hold onto arm rests of WC for about 5 seconds gaining balance in standing prior to ambulating. Stand to Sit  Assistance Level: Modified independent  Skilled Clinical Factors: pt required increased time to complete.  Pt was able to remember to reach back with UE for the hand rails in order to safely complete transfer  Bed To/From Chair  Technique: stand step  Assistance Level: Modified independent  Skilled Clinical Factors: increased time to complete  Car Transfer  Assistance Level: Modified independent  Skilled Clinical Factors: Pt demonstrates carry over from previous session and was able to remember to use stable surface to help them      Environmental Mobility  Ambulation  Surface: Level surface  Device: [address] [employer] [place]  Distance: 275' 4 turns (80' on uneven surface), 361' 2 turns, 205 1 turn, 20' 1 turn, 79' 1 turn  Activity: Within Unit  Activity Comments: Pt has in toeing on both LE however it is more pronounced on the RLE, toe-in to neutral on L. Pt exhibits some shuffling of their gait that is more pronounced as they become fatigued. RLE tends to cross over midline more frequently then their LLE. However pt demonstrated less fatigue during today's ambulation attempt but required a seated rest break after all ambulation attempts. Pt veered to the R when ambulating when they were fatigued. During second ambulation attempt pt used reacher to  pencil off of ground. They previously have a reacher at home that they were using prior to admission to the hospital. The patient was mod I with picking item off of the ground using reacher. Assistance Level: Modified independent  Gait Deviations: Slow mya  Skilled Clinical Factors: Pt has in toeing on both LE however it is more pronounced on the RLE. When turning pt can get twisted up in their LE due to the in-toeing that is present. Pt is mod I for this activity however has very slow mya during ambulation. ASSESSMENT/PROGRESS TOWARDS GOALS       Assessment  Assessment: Pt completed all ambulation today with SPC and mod I during ambulation pt has in-toeing on both LE and had some shuffling of their gait that became more pronounced as they became fatigued. Pt was mod I for their bed mobility and mod I for completing the car transfer. Pt has some decreased endurance, strength and functional mobility and continues to be below their PLOF. Pt will benefit form continued therapy for strengthening, balance, and safety before returning to their prior living situation.  Pt will benefit from continued PT after discharge in order to address her impairments and return to their PLOF.  Activity Tolerance: Patient tolerated treatment well  Discharge Recommendations: Home with assist PRN;Home with Home health PT  PT Equipment Recommendations  Equipment Needed: No    PM Session:  Subjective: Pt is agreeable and pleasant for therapy. Pt arrived to PT in [place] [id number]. Objective: Sit>stand with mod I. Ambulated 79' with [facility], mod I to steps on therapy terrace. Completed 12 steps on therapy terrace steps with B use of hand rails. Pt was reciprocal ascending but descending they were non-reciprocal leading with RLE. Ambulated 48' with [facility] Mod I on therapy terrace. Seated rest break following. Stand>sit was mod I, increased time, pt remember hand placement. Ambulated 79' with [facility] mod I to [address] from therapy terrace to [place] [address]. Pt required CGA when they didn't lift foot high enough to get foot clearance and had a LOB. Stand>sit mod I for seated rest break. Ambulated 15' with [facility] mod I to parallel bars to complete standing HEP. Pt completed 15 reps of the following exercises with B UE support: B hip abduction, B heel raises,  B hip extension, B knee flexion, B marches, squats. Provided education about HEP and handout. Login: [email or web address]    Access Code: [email or web address]     Pt ambulated 5' back to [place] [address] with no AD mod I. Stand>sit mod I with increased time to complete. Standing dynamic balance with reaching. Tossing ball back and forth with PT while SPT guards pt. 2 trials first trial was 3 minutes second pt only tolerated 90 seconds before arms became very fatigued and required seated rest break. SPT was SBA however pt didn't have any LOB episodes. Pt had seated rest break after the first trial also. Ambulated 21' to curb step with SPC. Pt required verbal cues on how to complete 6\" curb step, pt was able to bring RLE up onto curb and SPC cane with SBA however was unable to bring LLE up onto the curb.  Pt required mod A in order to catch balance after trying to bring up LLE and mod A in order to finish ascending curb step. When descending curb step pt was CGA and was able to descend with use of SPC and no LOB episodes. Assessment: Pt is mod I during ambulation but when fatigued begins to shuffle and can have LOB episodes. Pt requires rest breaks due to increased fatigue during activity but tolerated therapy well. Pt was mod I for 12 steps and mod I for curb step with use of B hand rails for steps and SPC for curb step. Pt will continue to benefit from skilled intervention in order to perform gait training, balance training, increase endurance and return to PLOF. Goals  Patient Goals   Patient goals : She wants to be able to do what she wants to do and return home independently. Short Term Goals  Time Frame for Short term goals: 1 week  Short term goal 1: Ambulating community level distances with mod I with use of RW. -met with [facility] 09/19/2022  Short term goal 2: Ambulating household distances with mod I and SPC. met 09/19/2022  Short term goal 3: Complete 12 steps mod I with use of bilateral handrails. met 09/19/2022  Short term goal 4: Sit<>supine with mod I. met 09/19/2022  Short term goal 5: Ascend/Descend curb step with wheeled walker with modified independent    PLAN OF CARE/SAFETY  Safety Devices  Type of Devices: Gait belt;Left in chair (left in wc for transport)  Restraints  Restraints Initially in Place: No    EDUCATION  Education  Education Given To: Patient  Education Provided: Role of Therapy;Plan of Care;Transfer Training;Mobility Training  Education Provided Comments: log roll, safety with hand placement  Education Method: Demonstration;Verbal  Barriers to Learning: Hearing  Education Outcome: Demonstrated understanding;Verbalized understanding        Therapy Time   Individual Concurrent Group Co-treatment   Time In 0815         Time Out 0900         Minutes 45           Timed Code Treatment Minutes: [address] Time     Individual Co-treatment   Time In 1515     Time Out 1600 Minutes [address] [place] [address], 09/20/22 at 10:57 AM  Therapist was present, directed the patient's care, made skilled judgement, and was responsible for assessment and treatment of the patient.        Brady Garcia, [id number]

## 2022-09-20 NOTE — PROGRESS NOTES
Magda Chaney  9/20/2022  [id number]    Chief Complaint: SBO (small bowel obstruction) ([facility] [address]    Subjective:  Patient seen this AM. Patient will be discussed in rehab unit conference today with entire rehab team of PT, OT, Speech, Nursing, Social service, and rehab unit manager to determine how much progress the patient has made in therapies, and when patient will be ready for D/C to home safely. We think patient will be ready for D/C to home in 2 days, on Thursday. She now needs supervision for transfers and is able ambulate 400 feet with modified independence using a cane. She is modified independent with her bathing dressing activities. Her abdominal incision appears to be healing well, and she does not have any abdominal pain. Repeat labs today look good, but show a drop in her H&H level. We will recheck again tomorrow. ROS: No N/C, SOB, chest pain, C/F. Objective:  Patient Vitals for the past 24 hrs:   BP Temp Temp src Pulse Resp SpO2 Weight   09/20/22 0407 135/61 97.4 °F (36.3 °C) Oral 70 17 94 % 118 lb 9.7 oz (53.8 kg)   09/19/22 1611 (!) 162/84 97.6 °F (36.4 °C) Oral 69 18 96 % --   09/19/22 0918 118/70 97.8 °F (36.6 °C) Oral 82 18 94 % --       Gen: No distress, pleasant. HEENT: Normocephalic, atraumatic. CV: Regular rate and rhythm. Resp: No respiratory distress. Abd: Soft, nontender   Ext: No edema. Neuro: Alert, oriented, appropriately interactive.      Wt Readings from Last 3 Encounters:   09/20/22 118 lb 9.7 oz (53.8 kg)       Laboratory data:   Lab Results   Component Value Date    WBC 6.1 09/20/2022    HGB 10.4 (L) 09/20/2022    HCT 30.9 (L) 09/20/2022    MCV 94.1 09/20/2022     09/20/2022       Lab Results   Component Value Date/Time     09/20/2022 05:37 AM    K 4.3 09/20/2022 05:37 AM    K 4.6 09/14/2022 08:05 AM     09/20/2022 05:37 AM    CO2 26 09/20/2022 05:37 AM    BUN 25 09/20/2022 05:37 AM    CREATININE 0.7 09/20/2022 05:37 AM    GLUCOSE 91 09/20/2022 05:37 AM    CALCIUM 9.3 09/20/2022 05:37 AM        Therapy progress:  PT  Position Activity Restriction  Other position/activity restrictions: abdominal incision  Objective     Sit to Stand: Supervision  Stand to sit: Supervision  Bed to Chair: Supervision  Device: Mayte De Guzman  Assistance: Stand by assistance  Distance: 200' with 2 turns, [de-identified]' with 4 turns on uneven surface (carpet)  OT  PT Equipment Recommendations  Equipment Needed: No  Toilet - Technique: Ambulating  Equipment Used: Grab bars (comfort height commode)             Body mass index is 21.01 kg/m². Assessment and Plan:     Small Bowel Obstruction - tramadol     Hypertension-monitor     Hypothyroid- synthroid     Asthma -Flonase     glaucoma -Xalatan     Bowels: Schedule Miralax + Senna S. Follow bowel movements. Enema or suppository if needed. Bladder: Check PVR x 3. [address] if PVR > 200ml or if any volume is > 500 ml. Pain:  Tramadol is ordered prn.          Jorge Collins MD 9/20/2022, 8:24 AM

## 2022-09-20 NOTE — PLAN OF CARE
Problem: Safety - Adult  Goal: Free from fall injury  Outcome: Progressing  Note: Fall risk band on patient. Orange light on outside of room. Non skid footwear in place. Alarms used appropriately. Patient instructed to call and wait for staff before getting up. Rounding done to anticipate needs. Appropriate safety devices used for transfers.

## 2022-09-20 NOTE — PROGRESS NOTES
Occupational Therapy  Facility/Department: [facility]  Rehabilitation Occupational Therapy Daily Treatment Note    Date: 22  Patient Name: Paula Goldmann       [address]  MRN: [id number]  Account: [de-identified]   : 1/10/1928  (80 y.o.) Gender: female                    Past Medical History:  has no past medical history on file. Past Surgical History:   has no past surgical history on file. Restrictions  Restrictions/Precautions: Fall Risk  Other position/activity restrictions: abdominal incision    Subjective  Subjective: pt met in dept, agreeable to OT  Restrictions/Precautions: Fall Risk             Objective     Cognition  Overall Cognitive Status: Exceptions  Memory: Decreased short term memory  Cognition Comment: Some difficulty remembering therapy activities from day to day  Orientation  Overall Orientation Status: Within Functional Limits  Orientation Level: Oriented X4 (BIMs 15/15)                      Functional Mobility  Device: Cane  Activity: To/From therapy gym  Assistance Level: Supervision  Skilled Clinical Factors: community reintegration on therapy terrace. Pt able to maneuver around obstacle, over variety of textured surfaces indep/supervision. Good safety awareness, transferred to park bench with some difficulty standing from low surface, but completed per self  Transfers  Surface: To chair with arms;From chair with arms; Wheelchair  Device: [employer] to General EMUZE Level: Supervision  Stand to [employer] Level: Supervision   OT Exercises  Resistive Exercises: shoulder flex/ext, horizontal abd/add,   elbow flex/ext, sup/pron, wrist flex/ext with 1 lb dumbbell - 10 reos each to improve activity tolerance for ADL, mobility       PM session  Pt met in dept, agreeable to OT  Nephew in room, agreeable to  pt Thurs about noon  Pt transported to dept via wheelchair  She was able to amb using st cane, indep, no LOB  Chose to prepare oatmeal in microwave - able to gather items from upper cabinets to prepare - heated water in microwave, added to cup to prepare. Able to carry warm bowl to table without device, using counter for support. Anticipate pt will be able to prepare meals indep at discharge. Tolerated being up approx 6 min for task, sat to chair for short rest before cleaning up dish, place to . Activity tolerance - seated in wheelchair, pt able to complete hand bike level 1 5 minutes to improve activity tolernace for ADL, mobility    Shower transfer - pt able to step over ledge to simulate shower stall with supervision, used wall to assist with balance, transferred to shower chair. She was then able to stand from shower chair sup/indep, no LOB. Pt stated she has a grab bar for assist in her shower at home. Pt to room per transport at end of session  Assessment  Assessment  Assessment: Pt cont to improve with activity tolerance. Improved with pace of mobility and balance. She is able to complete transfers, functional mobility using st cane indep/supervision on level and unlevel surfaces. Discharge is planned for Thurs 9/21  No further DME or follow up home OT  Activity Tolerance: Patient tolerated treatment well  Discharge Recommendations: Home with assist PRN  OT Equipment Recommendations  Other: most likely has all needed equipment  Safety Devices  Safety Devices in place: Yes  Type of devices: Chair alarm in place; Left in chair;Call light within reach;Gait belt    Patient Education  Education  Education Given To: Patient  Education Provided:  Mobility Training;Transfer Training  Education Provided Comments: HEP  Education Method: Demonstration;Verbal;Teach Back  Education Outcome: Verbalized understanding;Demonstrated understanding;Continued education needed    Plan  Plan  Times per Week: 5-6  Times per Day: Twice a day  Plan Weeks: 1 week  Current Treatment Recommendations: Strengthening;Balance training;Functional mobility training; Endurance training; Safety education & training;Patient/Caregiver education & training;Equipment evaluation, education, & procurement;Self-Care / ADL    Goals  Patient Goals   Patient goals : I want to get stronger and be able to take care of myself again  Short Term Goals  Time Frame for Short term goals: 1 week pt will. .  Short Term Goal 1: bathe indep with shower chair, grab bars Goal not met, requires set up to cover incision  Short Term Goal 2: dress UB and LB indep except ISHAN hose if she needs to continue to wear  Goal MEt  Short Term Goal 3: toilet indep with grab bars  Goal Met  Short Term Goal 4: transfer indep with LRAD  Short Term Goal 5: functional mobility  and simple meal prep indep with LRAD  Short Term Goal 6: improve activity tolerance to stand 8 min for ADL/IADL tasks  Long Term Goals  Time Frame for Long term goals : same as stg                   Therapy Time   Individual Concurrent Group Co-treatment   Time In 1115         Time Out 1200         Minutes 45         Timed Code Treatment Minutes: 45 Minutes     Therapy Time     Individual Co-treatment   Time In 1400 [facility]     Time Out 1430     Minutes Roxanne 40 Gus Mirza OTr/L 770

## 2022-09-21 ENCOUNTER — APPOINTMENT (OUTPATIENT)
Dept: CT IMAGING | Age: 87
DRG: 949 | End: 2022-09-21
Attending: PHYSICAL MEDICINE & REHABILITATION
Payer: MEDICARE

## 2022-09-21 LAB
HCT VFR BLD CALC: 29.1 % (ref 36–48)
HEMOGLOBIN: 9.8 G/DL (ref 12–16)
MCH RBC QN AUTO: 31.4 PG (ref 26–34)
MCHC RBC AUTO-ENTMCNC: 33.8 G/DL (ref 31–36)
MCV RBC AUTO: 93 FL (ref 80–100)
PDW BLD-RTO: 14.2 % (ref 12.4–15.4)
PLATELET # BLD: 289 K/UL (ref 135–450)
PMV BLD AUTO: 8.3 FL (ref 5–10.5)
RBC # BLD: 3.14 M/UL (ref 4–5.2)
WBC # BLD: 4.9 K/UL (ref 4–11)

## 2022-09-21 PROCEDURE — 97530 THERAPEUTIC ACTIVITIES: CPT

## 2022-09-21 PROCEDURE — 97535 SELF CARE MNGMENT TRAINING: CPT

## 2022-09-21 PROCEDURE — 36415 COLL VENOUS BLD VENIPUNCTURE: CPT

## 2022-09-21 PROCEDURE — 85027 COMPLETE CBC AUTOMATED: CPT

## 2022-09-21 PROCEDURE — 94760 N-INVAS EAR/PLS OXIMETRY 1: CPT

## 2022-09-21 PROCEDURE — 97116 GAIT TRAINING THERAPY: CPT

## 2022-09-21 PROCEDURE — 74176 CT ABD & PELVIS W/O CONTRAST: CPT

## 2022-09-21 PROCEDURE — 6360000002 HC RX W HCPCS: Performed by: PHYSICAL MEDICINE & REHABILITATION

## 2022-09-21 PROCEDURE — 97110 THERAPEUTIC EXERCISES: CPT

## 2022-09-21 PROCEDURE — 6370000000 HC RX 637 (ALT 250 FOR IP): Performed by: PHYSICAL MEDICINE & REHABILITATION

## 2022-09-21 PROCEDURE — 1280000000 HC REHAB R&B

## 2022-09-21 RX ORDER — FUROSEMIDE 20 MG/1
20 TABLET ORAL DAILY
Status: DISCONTINUED | OUTPATIENT
Start: 2022-09-21 | End: 2022-09-22 | Stop reason: HOSPADM

## 2022-09-21 RX ADMIN — FUROSEMIDE 20 MG: 20 TABLET ORAL at 11:43

## 2022-09-21 RX ADMIN — ACETAMINOPHEN 650 MG: 325 TABLET ORAL at 00:50

## 2022-09-21 RX ADMIN — THERA TABS 1 TABLET: TAB at 07:10

## 2022-09-21 RX ADMIN — TAMSULOSIN HYDROCHLORIDE 0.4 MG: 0.4 CAPSULE ORAL at 21:18

## 2022-09-21 RX ADMIN — FLUTICASONE PROPIONATE 1 SPRAY: 50 SPRAY, METERED NASAL at 07:10

## 2022-09-21 RX ADMIN — LEVOTHYROXINE SODIUM 100 MCG: 0.1 TABLET ORAL at 05:46

## 2022-09-21 RX ADMIN — HEPARIN SODIUM 5000 UNITS: 5000 INJECTION INTRAVENOUS; SUBCUTANEOUS at 05:46

## 2022-09-21 RX ADMIN — Medication 1 TABLET: at 07:10

## 2022-09-21 RX ADMIN — LATANOPROST 1 DROP: 50 SOLUTION/ DROPS OPHTHALMIC at 21:17

## 2022-09-21 RX ADMIN — Medication 1 TABLET: at 21:18

## 2022-09-21 ASSESSMENT — PAIN SCALES - GENERAL
PAINLEVEL_OUTOF10: 1
PAINLEVEL_OUTOF10: 3
PAINLEVEL_OUTOF10: 0

## 2022-09-21 ASSESSMENT — PAIN DESCRIPTION - ORIENTATION: ORIENTATION: LEFT

## 2022-09-21 ASSESSMENT — PAIN - FUNCTIONAL ASSESSMENT: PAIN_FUNCTIONAL_ASSESSMENT: ACTIVITIES ARE NOT PREVENTED

## 2022-09-21 ASSESSMENT — PAIN DESCRIPTION - LOCATION: LOCATION: SHOULDER

## 2022-09-21 NOTE — PROGRESS NOTES
Occupational Therapy  Facility/Department: [facility]  Rehabilitation Occupational Therapy Daily Treatment Note/ discharge summary    Date: 22  Patient Name: Robert Vines       Room: [place] [id number]-  MRN: [id number]  Account: [de-identified]   : 1/10/1928  (80 y.o.) Gender: female                    Past Medical History:  has no past medical history on file. Past Surgical History:   has no past surgical history on file. Restrictions  Restrictions/Precautions: Fall Risk  Other position/activity restrictions: abdominal incision - pt spoke with physician's office, stated she did not need to cover staples for shower    Subjective     Restrictions/Precautions: Fall Risk             Objective     Cognition  Overall Cognitive Status: Exceptions  Memory: Decreased short term memory  Cognition Comment: Some difficulty remembering therapy activities from day to day  Orientation  Overall Orientation Status: Within Functional Limits  Orientation Level: Oriented X4 (BIMs 1515)         ADL  Grooming/Oral Hygiene  Skilled Clinical Factors: oral care and hair indep standing at sink  Upper Extremity Bathing  Assistance Level: Independent  Skilled Clinical Factors: gathered clothing, does not have to cover incision per physician  Lower Extremity Bathing  Assistance Level: Modified independent  Skilled Clinical Factors: long sponge for LE to bathe, towel /reacher to dry.   Stood with grab bar to bathe buttocks/db area  Upper Extremity Dressing  Assistance Level: Modified independent  Skilled Clinical Factors: camisole, shirt  Lower Extremity Dressing  Assistance Level: Modified independent  Skilled Clinical Factors: reacher to thread briefs and pants over feet, over hips using grab bar for balance, indep  Putting On/Taking Off Footwear  Equipment Provided: Reachers  Assistance Level: Minimal assistance  Skilled Clinical Factors: elastic shoe laces, long shoe horn, leans forward to adjust tongue of shoe, indep, slipper socks per self, but assist with ISHAN hose  Toileting  Assistance Level: Modified independent  Skilled Clinical Factors: urinated and BM  on commode per pt report, pants down/up indep  Toilet Transfers  Equipment: Grab bars  Additional Factors: With handrails  Assistance Level: Modified independent  Tub/Shower Transfers  Type: Shower  Transfer To: Shower chair with back  Assistance Level: Modified independent  Skilled Clinical Factors: grab bar          Functional Mobility  Device: Cane  Activity: To/From bathroom; Retrieve items;Transport items  Assistance Level: Modified independent  Skilled Clinical Factors: st cane in room to gather clothing indep  Transfers  Surface: To chair with arms;From chair with arms  Device: Cane  Sit to Stand  Assistance Level: Modified independent  Stand to Sit  Assistance Level: Modified independent       PM session  Pt met in dept, PT communicated that pt was a little less stedy on her feet this PM.  Pt stated she was fatigued, Dr Kaylie Beasley noted that H &H has been dropping the last couple days  Pt stood from wheelchair, amb to recliner indep using st cane, no LOB noted. UE ex - 1 lb dumbbell to improve activity tolerance for ADL/mobility tasks  Shoulder flex/ext, horizontal abd/add, elbow flex/ext, sup/pron, wrist flex/ext started with 15, but decreased to 10 per pt request, stated she had pain left bicep last night requiring ice and tylenol. No complaint of pain today    Mobility - pt amb indep with st cane to activity room, ADL apt, transferring to couch, bed, recliner indep. She was more cautious this afternoon, stated she felt tired. Returned to room, transferred to commode with grab bars indep. Urinated on commode , managed clothing and hygiene indep  Pt requested to change clothing to dress. Removed pants using reacher, shoes with long shoe horn. Shirt.   She donned dress indep, slipper socks indep  She then amb to sink, washed hands indep, progressed to recliner  Chair alarm set, call light in reach    Assessment  Assessment  Assessment: Pt met all goals, discharge planned for tomorrow, 9/22/22. Nephew to . Pt completed bathing indep on shower chair, dressed indep except ISHAN hose. Transfers and functional mobility are indep using st cane, grab bars as needed. She is able to complete simiple meal prep indep - will have assist for heavier tasks . Pt has all needed DME, no further OT will be required at discharge  Activity Tolerance: Patient tolerated treatment well  Discharge Recommendations: Home with assist PRN  OT Equipment Recommendations  Equipment Needed: No  Safety Devices  Safety Devices in place: Yes  Type of devices: Chair alarm in place; Left in chair;Call light within reach;Gait belt    Patient Education  Education  Education Given To: Patient  Education Provided Comments: ISHAN serrato - neighbor to place on pt if able  Education Method: Demonstration;Verbal;Teach Back  Education Outcome: Verbalized understanding;Demonstrated understanding;Continued education needed    Plan  Plan  Times per Week: 5-6  Times per Day: Twice a day  Plan Weeks: 1 week  Current Treatment Recommendations: Strengthening;Balance training;Functional mobility training; Endurance training; Safety education & training;Patient/Caregiver education & training;Equipment evaluation, education, & procurement;Self-Care / ADL    Goals  Patient Goals   Patient goals : I want to get stronger and be able to take care of myself again  Short Term Goals  Time Frame for Short term goals: 1 week pt will. .  Short Term Goal 1: bathe indep with shower chair, grab bars Goal met, incision no longer needs to be covered per physician  Short Term Goal 2: dress UB and LB indep except ISHAN hose if she needs to continue to wear  Goal MEt  Short Term Goal 3: toilet indep with grab bars  Goal Met  Short Term Goal 4: transfer indep with LRAD Goal Met  Short Term Goal 5: functional mobility  and simple meal prep indep with LRAD Goal Met  Short Term Goal 6: improve activity tolerance to stand 8 min for ADL/IADL tasks Goal Met  Long Term Goals  Time Frame for Long term goals : same as stg                   Therapy Time   Individual Concurrent Group Co-treatment   Time In 0815         Time Out 0915         Minutes 60         Timed Code Treatment Minutes: 60 Minutes     Therapy Time     Individual Co-treatment   Time In 1400 [facility]     Time Out 1430     Minutes Roxanne 40 Mark Soliman OTR/L 770

## 2022-09-21 NOTE — PROGRESS NOTES
Patient admitted to rehab with debility. A/Ox4. Transfers with cane x 1. Mobility restrictions: WBAT. On Reg diet, tolerating well. Medications taken whole with thins. On heparin and chayo hose for DVT prophylaxis. Skin: reddened buttocks, using triad cream, mepilex to soft heels, incision on ABD, GENE, staples. Pt continues to have LE edema. Furosemide started. Ct and occult stool ordered due to decrease in H&H. Oxygen: none. LDA: none. Has been continent of bowel and incontinent of bladder. LBM 9/21. Chair/bed alarms in use and call light in reach.

## 2022-09-21 NOTE — PROGRESS NOTES
Nutrition Assessment     Type and Reason for Visit: Reassess    Nutrition Recommendations/Plan:   Continue Regular diet  Continue to monitor while inpatient. Malnutrition Assessment:  Malnutrition Status: No malnutrition    Nutrition Assessment:  Follow-up. Pt intakes remains at 76%-100%. Pt weight remains steady at around 116lbs - 120lbs. Will continue to monitor while inpatient and continue regular diet. Estimated Daily Nutrient Needs:  Energy (kcal):  1368 kcals - 1641 kcals (25-30kcals/kg 54.7kg CBW) Weight Used for Energy Requirements: Current     Protein (g):  77 g - 88 g (1.4 - 1.6g/kg 53.5kg CBW) Weight Used for Protein Requirements: Current        Fluid (ml/day):  1368 ml - 1641 ml Method Used for Fluid Requirements: 1 ml/kcal    Nutrition Related Findings:   Reviewed Labs. Elevated BUN - 25 Wound Type: Surgical Incision    Current Nutrition Therapies:    ADULT DIET; Regular    Anthropometric Measures:  Height: 5' 3\" (160 cm)  Current Body Wt: 120 lb 9.5 oz (54.7 kg)   BMI: 21.4    Nutrition Diagnosis:   No nutrition diagnosis at this time     Nutrition Interventions:   Food and/or Nutrient Delivery: Continue Current Diet  Nutrition Education/Counseling: No recommendation at this time  Coordination of Nutrition Care: Continue to monitor while inpatient       Goals:  Previous Goal Met: Goal(s) Achieved  Goals: Meet at least 75% of estimated needs       Nutrition Monitoring and Evaluation:   Behavioral-Environmental Outcomes: None Identified  Food/Nutrient Intake Outcomes: Food and Nutrient Intake  Physical Signs/Symptoms Outcomes: Biochemical Data    Discharge Planning:     Too soon to determine     [address]: [phone]

## 2022-09-21 NOTE — PLAN OF CARE
Problem: Discharge Planning  Goal: Discharge to home or other facility with appropriate resources  9/21/2022 1054 by Leonid Sparrow RN  Outcome: Progressing  Flowsheets (Taken 9/21/2022 3098)  Discharge to home or other facility with appropriate resources: Identify barriers to discharge with patient and caregiver  9/20/2022 2129 by Candice Whitmoer RN  Outcome: Progressing  Flowsheets (Taken 9/20/2022 2100)  Discharge to home or other facility with appropriate resources: Identify barriers to discharge with patient and caregiver     Problem: Skin/Tissue Integrity  Goal: Absence of new skin breakdown  Description: 1. Monitor for areas of redness and/or skin breakdown  2. Assess vascular access sites hourly  3. Every 4-6 hours minimum:  Change oxygen saturation probe site  4. Every 4-6 hours:  If on nasal continuous positive airway pressure, respiratory therapy assess nares and determine need for appliance change or resting period. 9/21/2022 1054 by Leonid Sparrow RN  Outcome: Progressing  9/20/2022 2129 by Candice Whitmore RN  Outcome: Progressing     Problem: Safety - Adult  Goal: Free from fall injury  9/21/2022 1054 by Leonid Sparrow RN  Outcome: Progressing  Flowsheets (Taken 9/21/2022 1052)  Free From Fall Injury: Instruct family/caregiver on patient safety  9/20/2022 2129 by Candice Whitmore RN  Outcome: Progressing  Note: Fall risk band on patient. Orange light on outside of room. Non skid footwear in place. Alarms used appropriately. Patient instructed to call and wait for staff before getting up. Rounding done to anticipate needs. Appropriate safety devices used for transfers.       Problem: ABCDS Injury Assessment  Goal: Absence of physical injury  9/21/2022 1054 by Leonid Sparrow RN  Outcome: Progressing  Flowsheets (Taken 9/21/2022 1052)  Absence of Physical Injury: Implement safety measures based on patient assessment  9/20/2022 2129 by Candice Whitmore RN  Outcome: Progressing     Problem: Pain  Goal: Verbalizes/displays adequate comfort level or baseline comfort level  Outcome: Progressing

## 2022-09-21 NOTE — CARE COORDINATION
Team Conference held on Tuesday, 9-20*-2022  Team reviewed progress, goals, DME. Team suggests DC on Thursday, 9- to home with home care orders for sn/pt. No DME is recommended at this time. Met with patient to review. She does NOT want any home care services as she states she will do her own exercises herself. She denies desire for out patient. She denies recommendation to set it up and then cancel  once she is home to ensure she is okay at home. IMM letter presnted. She wants to take her own scripts to her own pharmacy.   [facility]     Case Management   [phone]    9/21/2022  3:24 PM

## 2022-09-21 NOTE — PROGRESS NOTES
Called Dr. Alonzo Asencio at 1500 to see if afternoon dose of heparin should be held due to decreasing H&H levels. Per : hold heparin until results of blood occult stool and CT come back.

## 2022-09-21 NOTE — PROGRESS NOTES
Physical Therapy  Facility/Department: [address] REHAB  Rehabilitation Physical Therapy Treatment Note    NAME: Mega Ayers  : 1/10/1928 (80 y.o.)  MRN: [id number]  CODE STATUS: Full Code    Date of Service: 22       Restrictions:  Restrictions/Precautions: Fall Risk  Position Activity Restriction  Other position/activity restrictions: abdominal incision - pt spoke with [de-identified] office, stated she did not need to cover staples for shower   Pertinent medical information:  Additional Pertinent Hx: Per Dr. Ingrid Garcia H&P, \"Patient is admitted from [facility] after treatment for small bowel obstruction. She is a 80-year-old female with a known history of hypertension, asthma, glaucoma, who presented on  to [facility] with high-grade mechanical small bowel obstruction. Patient was taken to surgery the same day for exploratory laparotomy and repair of abdominal hernia causing obstruction of her small bowel. Postop NG tube remains in place, this was removed as her diet was slowly advanced, and now she is tolerating a regular diet. Patient also placed back on Synthroid for her hypothyroidism. The time of discharge, patient is tolerating oral food and hydration, was ambulating with assistance after she started therapies. Patient needs more therapy before discharge to home safely. \"   SUBJECTIVE  Subjective  Subjective: Pt arrived in therapy gym in [facility]. Pt denies pain at this time. Pain: denies pain at this time      OBJECTIVE  Functional Mobility  Sit to Stand  Assistance Level: Modified independent;Stand by assist  Skilled Clinical Factors: Pt able to complete with increased time. When going from sit to stand pt will hold onto arm rests of WC for about 5 seconds gaining balance in standing prior to ambulating.  When performing sit>stand from lower softer surface (couch) and only one arm rest pt was SBA and required two attempts in order to complete and shift weight anteriorly enough for them to rise off of surface. Stand to Sit  Assistance Level: Modified independent  Skilled Clinical Factors: pt required increased time to complete. Pt was able to remember to reach back with UE for the hand rails in order to safely complete transfer      Environmental Mobility  Ambulation  Surface: Level surface  Device: [email or web address] (Last ambulation attempt was with no AD.)  Distance: 314' X 8 turns on with (240' uneven), 146' 2 turns, 80', 68' 4 turns (without AD)  Activity: Within Unit (and on therapy terrace)  Activity Comments: Pt has in toeing on both LE however it is more pronounced on the RLE, toe-in to neutral on L. Pt exhibits some shuffling of their gait that is more pronounced as they become fatigued. RLE tends to cross over midline more frequently then their LLE. However pt demonstrated less fatigue during todays ambulation attempt but required a seated rest break after all ambulation attempts. Pt was able to manage turns and transitions of surfaces without any LOB. Assistance Level: Modified independent  Gait Deviations: Slow mya  Skilled Clinical Factors: Pt has in toeing on both LE however it is more pronounced on the RLE. When turning pt can get twisted up in their LE due to the in-toeing that is present. Pt is mod I for this activity however has very slow mya during ambulation. When ambulating with no AD pt is unsteady and has decreased arm swing and are gaurded with their UE. PT Exercises  Exercise Treatment: Ambulating 20' with 1 turn stepping over poles laying on therpay ground working on stepping high and driving knee. This was completed with SBA-CGA and had 4 trials 2 with leading with the RLE and 2 with leading with the LLE. When patient was leading with LLE they had a slower mya and took a 2 second break before intiiating the step forward. Standing marches onto 1\" box patient performed 2 trials of 10 reps each time.  During this exercises patient was CGA and had occassional LOB episodes during which they required UE support of the LUE via HHA from SPT in order to correct. SPT provided verbal cues during this LOB episodes to shift weight anterior as pt would lean posterior during LOB episode. ASSESSMENT/PROGRESS TOWARDS GOALS  Assessment  Assessment: Pt completed all ambulation today with SPC and mod I during ambulation, besides the last ambulation attempt oad 76', pt has in-toeing on both LE and had some shuffling of their gait that became more pronounced as they became fatigued. When ambulating without AD patient has increased unsteadiness. Pt had some LOB episodes when performing higher level dynamic standing balance and required RUE support when they experienced the LOB episodes. Pt has some decreased endurance, strength and functional mobility and continues to be below their PLOF. Pt will benefit form continued therapy for strengthening, balance, and safety before returning to their prior living situation. Pt will benefit from continued PT after discharge in order to address her impairments and return to their PLOF. Activity Tolerance: Patient tolerated treatment well  Discharge Recommendations: Home with assist PRN;Home with Home health PT  PT Equipment Recommendations  Equipment Needed: No    PM Session  Subjective: Pt is very fatigued this afternoon but is agreeable to therapy. Pt denies pain at this time. Objective:Ambulated 36' with SPC, mod I however became CGA when patient had 2 LOB episodes during ambulation, pt continued ambulation after first LOB however had another LOB catching their feet on the ground. After second LOB episode pt completed stand>sit into WC for seated rest break. LOB due to decreased foot clearance, caught R foot on floor both times which resulted in LOB. Performed sit>stand with mod I and ambulated 5' to couch with SPC and CGA. Pt performed stand>sit with mod I but had decreased control of the descent and sat down rapidly.    Exercises performed in long sitting on couch: B heel slides X 15, B ankle pumps X15. In short sitting on couch: B LAQ X 15, B marches X 15, adductor squeeze using pillow X 15 hold 3 seconds. Seated rest break following exercises. Sit>stand from couch (lower soft surface) pt was able to complete with SBA. Before completing pt scooted to edge of couch and was able to anteriorly shift their weight to help with rising from couch. Ambulation with SPC 80' CGA. Pt was fearful of LOB episode again and was marching higher during ambulation to ensure that they would have foot clearance. Pt also demonstrated decreased gait speed during this ambulation attempt. Stand>sit with supervision providing verbal cues to reach back. Seated rest break following ambulation. Dynamic standing balance with reaching outside base of support to grab cones from PT hands while SPT guards with CGA. This is performed for 2 minutes for two trials, between trials patient had seated rest break. Stand>sit was mod I. Assessment: Pt has increased fatigue today during session and is more unsteady during ambulation with SPC and required CGA at certain points throughout ambulation and had several LOB episodes when they had decreased foot clearance. She has lower blood count and is being worked up for this. It may be a cause of some increased unsteadiness. RN was notified. Pt is able to maintain balance during dynamic balance exercises with CGA. Pt was upset due to changes in medical condition and impending CT scan. Goals  Patient Goals   Patient goals : She wants to be able to do what she wants to do and return home independently. Short Term Goals  Time Frame for Short term goals: 1 week  Short term goal 1: Ambulating community level distances with mod I with use of RW. -met with [employer] 09/19/2022  Short term goal 2: Ambulating household distances with mod I and SPC. met 09/19/2022  Short term goal 3: Complete 12 steps mod I with use of bilateral handrails. met 09/19/2022  Short term goal 4: Sit<>supine with mod I. met 09/19/2022  Short term goal 5: Ascend/Descend curb step with wheeled walker with modified independent    [address]  minutes of therapy at least 5 out of 7 days a week  Current Treatment Recommendations: Strengthening;Balance training;Functional mobility training;Transfer training;Gait training;Stair training; Safety education & training  Safety Devices  Type of Devices: Gait belt;Left in chair (left in wc for transport)  Restraints  Restraints Initially in Place: No    EDUCATION  Education  Education Given To: Patient  Education Provided: Role of Therapy;Plan of Care;Transfer Training;Mobility Training  Education Provided Comments: log roll, safety with hand placement  Education Method: Demonstration;Verbal  Barriers to Learning: Hearing  Education Outcome: Demonstrated understanding;Verbalized understanding        Therapy Time   Individual Concurrent Group Co-treatment   Time In 1030         Time Out 1115         Minutes 45           Second Session Therapy Time     Individual Co-treatment   Time In 1300     Time Out 1345     Minutes 38 [facility], 09/21/22 at 12:11 PM  Therapist was present, directed the patient's care, made skilled judgement, and was responsible for assessment and treatment of the patient.        Nilam Browning, [id number]

## 2022-09-21 NOTE — PLAN OF CARE
Problem: Safety - Adult  Goal: Free from fall injury  9/20/2022 2129 by Boom Ordonez RN  Outcome: Progressing  Note: Fall risk band on patient. Orange light on outside of room. Non skid footwear in place. Alarms used appropriately. Patient instructed to call and wait for staff before getting up. Rounding done to anticipate needs. Appropriate safety devices used for transfers.

## 2022-09-21 NOTE — DISCHARGE INSTR - COC
Continuity of Care Form    Patient Name: Kike Gibson   :  1/10/1928  MRN:  [id number]    Admit date:  2022  Discharge date:  2022    Code Status Order: Full Code   Advance Directives:     Admitting Physician:  Angely Melendez MD  PCP: Va Fulton MD    Discharging Nurse: Bhargavi Gregory RN  [address] Unit/Room#: F7E-8973/3260-01  Discharging Unit Phone Number: [phone]    Emergency Contact:   Extended Emergency Contact Information  Primary Emergency Contact: Sanjuanita Nichole. [address] [place] [address] Phone: [phone]  Relation: Brother/Sister    Past Surgical History:  History reviewed. No pertinent surgical history.     Immunization History:   Immunization History   Administered Date(s) Administered    COVID-19, PFIZER GRAY top, DO NOT Dilute, (age 15 y+), IM, 30 mcg/0.3 mL 2022    COVID-19, PFIZER PURPLE top, DILUTE for use, (age 15 y+), 30mcg/0.3mL 2021, 2021, 10/03/2021       Active Problems:  Patient Active Problem List   Diagnosis Code    [facility] K56.609       Isolation/Infection:   Isolation            No Isolation          Patient Infection Status       None to display            Nurse Assessment:  Last Vital Signs: /65   Pulse 77   Temp 97.9 °F (36.6 °C) (Oral)   Resp 16   Ht 5' 3\" (1.6 m)   Wt 120 lb 9.5 oz (54.7 kg)   SpO2 93%   BMI 21.36 kg/m²     Last documented pain score (0-10 scale): Pain Level: 1  Last Weight:   Wt Readings from Last 1 Encounters:   22 120 lb 9.5 oz (54.7 kg)     Mental Status:  oriented and alert    IV Access:  - None    Nursing Mobility/ADLs:  Walking   Independent with cane  Transfer  Independent  Bathing  Independent  Dressing  Independent  [address]  Med Delivery   whole    Wound Care Documentation and Therapy:  Wound 22 Abdomen Lower;Medial (Active)   Wound Etiology Surgical 22 0713   Wound Cleansed Soap and water 09/18/22 0745   Dressing/Treatment Open to air 09/21/22 0713   Drainage Amount None 09/21/22 0713   Odor None 09/21/22 0713   [place]-wound Assessment Intact 09/21/22 0713   Number of days: 7       Incision 09/14/22 Abdomen Left; Lower;Medial (Active)   Dressing/Treatment Open to air 09/21/22 0713   Closure Staples 09/21/22 0713   Margins Approximated 09/21/22 0713   Incision Assessment Dry 09/21/22 0713   Drainage Amount None 09/21/22 0713   Odor None 09/21/22 0713   [place]-incision Assessment Intact 09/21/22 0713   Number of days: 6        Elimination:  Continence: Bowel: Yes  Bladder: Yes  Urinary Catheter: None   Colostomy/Ileostomy/Ileal Conduit: No       Date of Last BM: 09/22/2022    Intake/Output Summary (Last 24 hours) at 9/21/2022 1122  Last data filed at 9/21/2022 0656  Gross per 24 hour   Intake 780 ml   Output --   Net 780 ml     I/O last 3 completed shifts: In: 1200 [P.O.:1200]  Out: -     Safety Concerns:     None    Impairments/Disabilities:      None    Nutrition Therapy:  Current Nutrition Therapy:   - Oral Diet:  General    Routes of Feeding: Oral  Liquids: Thin Liquids  Daily Fluid Restriction: no  Last Modified Barium Swallow with Video (Video Swallowing Test): not done    Treatments at the Time of Hospital Discharge:   Respiratory Treatments: ***  Oxygen Therapy:  is not on home oxygen therapy. Ventilator:    - No ventilator support    Rehab Therapies: Physical Therapy, nursing  Weight Bearing Status/Restrictions: No weight bearing restrictions  Other Medical Equipment (for information only, NOT a DME order):     Other Treatments: ***    Patient's personal belongings (please select all that are sent with patient):  Glasses, shoes, top, pants     RN SIGNATURE:  Electronically signed by Mary Ramsey RN on 9/22/22 at 11:39 AM EDT    CASE MANAGEMENT/SOCIAL WORK SECTION    Inpatient Status Date: 9-    Readmission Risk Assessment Score:  Readmission Risk              Risk of Unplanned Readmission:  9         Patient [address] orders. / signature: Electronically signed by Ayesha Darnell on 9/21/22 at 3:26 PM EDT    PHYSICIAN SECTION    Prognosis: Good    Condition at Discharge: Stable    Rehab Potential (if transferring to Rehab): Good    Recommended Labs or Other Treatments After Discharge: PT,VN    Physician Certification: I certify the above information and transfer of Madi Esqueda  is necessary for the continuing treatment of the diagnosis listed and that she requires Home Care for less 30 days.      Update Admission H&P: No change in H&P    PHYSICIAN SIGNATURE:  Electronically signed by Cj Kent MD on 9/21/22 at 11:22 AM EDT

## 2022-09-21 NOTE — PROGRESS NOTES
Letty Tijerina  9/21/2022  [id number]    Chief Complaint: SBO (small bowel obstruction) ([facility]    Subjective:   Patient seen this AM. Patient was discussed yesterday in rehab unit conference with entire rehab team , and we think the patient will be ready for D/C to home tomorrow. She now needs supervision for transfers and is able ambulate 400 feet with modified independence using a cane. She is modified independent with her bathing dressing activities. Her abdominal incision appears to be healing well, and she does not have any abdominal pain. Repeat labs yesterday showed a drop in her H&H level. Repeat today shows another drop, so we will check her stool for occult blood. We will also get a CT scan of the abdomen and pelvis to check for fluid collection. Patient continues to have swelling of both legs, so we will give her Lasix for today and tomorrow. Patient to follow-up with her surgeon next week. ROS: No N/C, SOB, chest pain, C/F. Objective:  Patient Vitals for the past 24 hrs:   BP Temp Temp src Pulse Resp SpO2 Weight   09/21/22 0700 134/65 97.9 °F (36.6 °C) Oral 77 16 93 % --   09/21/22 0422 (!) 110/57 98 °F (36.7 °C) Oral 93 17 93 % 120 lb 9.5 oz (54.7 kg)   09/20/22 2045 (!) 149/61 97.7 °F (36.5 °C) -- 76 16 96 % --   09/20/22 0930 (!) 125/55 97.8 °F (36.6 °C) Oral 70 16 97 % --       Gen: No distress, pleasant. HEENT: Normocephalic, atraumatic. CV: Regular rate and rhythm. Resp: No respiratory distress. Abd: Soft, nontender   Ext: No edema. Neuro: Alert, oriented, appropriately interactive.      Wt Readings from Last 3 Encounters:   09/21/22 120 lb 9.5 oz (54.7 kg)       Laboratory data:   Lab Results   Component Value Date    WBC 4.9 09/21/2022    HGB 9.8 (L) 09/21/2022    HCT 29.1 (L) 09/21/2022    MCV 93.0 09/21/2022     09/21/2022       Lab Results   Component Value Date/Time     09/20/2022 05:37 AM    K 4.3 09/20/2022 05:37 AM    K 4.6 09/14/2022 08:05 AM     09/20/2022 05:37 AM    CO2 26 09/20/2022 05:37 AM    BUN 25 09/20/2022 05:37 AM    CREATININE 0.7 09/20/2022 05:37 AM    GLUCOSE 91 09/20/2022 05:37 AM    CALCIUM 9.3 09/20/2022 05:37 AM        Therapy progress:  PT  Position Activity Restriction  Other position/activity restrictions: abdominal incision  Objective     Sit to Stand: Supervision  Stand to sit: Supervision  Bed to Chair: Supervision  Device: [address]: Stand by assistance  Distance: 200' with 2 turns, [de-identified]' with 4 turns on uneven surface (carpet)  OT  PT Equipment Recommendations  Equipment Needed: No  Toilet - Technique: Ambulating  Equipment Used: Grab bars (comfort height commode)             Body mass index is 21.36 kg/m². Assessment and Plan:     Small Bowel Obstruction - tramadol     Hypertension-monitor     Hypothyroid- synthroid     Asthma -Flonase     glaucoma -Xalatan     Bowels: Schedule Miralax + Senna S. Follow bowel movements. Enema or suppository if needed. Bladder: Check PVR x 3. [address] if PVR > 200ml or if any volume is > 500 ml. Pain:  Tramadol is ordered prn.          Yg Brand MD 9/21/2022, 8:13 AM

## 2022-09-21 NOTE — CARE COORDINATION
SOCIAL WORK DISCHARGE SUMMARY        DATE OF DISCHARGE:  Thursday, 9-      LOCATION:    Home        DENIED HOME CARE ORDERS FOR SN/PT         TIME:   Friend    12 noon        PHARMACY:  Personal Pharmacy        DME:     none ordered.       Romi Dumont [facility]     Case Management   [phone]    9/21/2022  3:25 PM

## 2022-09-21 NOTE — PROGRESS NOTES
Patient admitted to rehab with debility s/p small bowel obstruction. A/Ox4. Transfers with cane x 1. Mobility restrictions: WBAT. On regular diet, tolerating well. Medications taken whole with thin liquids. On heparin, chayo hose for DVT prophylaxis. Skin: buttocks reddened - triad cream, heels soft, mepilex protectors applied, incision to abdomen GENE, no drainage noted. Oxygen: RA. LDA: none. Has been continent of bowel and incontinent at times of bladder. LBM 9/20. Chair/bed alarms in use and call light in reach. Will monitor for safety.

## 2022-09-21 NOTE — CARE COORDINATION
09/21/22 1522   IMM Letter   IMM Letter given to Patient/Family/Significant other/Guardian/POA/by: presented to patient by yris Ralph   IMM Letter date given: 09/21/22   IMM Letter time given: 0205

## 2022-09-22 VITALS
RESPIRATION RATE: 18 BRPM | HEIGHT: 63 IN | OXYGEN SATURATION: 94 % | HEART RATE: 64 BPM | BODY MASS INDEX: 20.98 KG/M2 | WEIGHT: 118.39 LBS | SYSTOLIC BLOOD PRESSURE: 157 MMHG | DIASTOLIC BLOOD PRESSURE: 67 MMHG | TEMPERATURE: 97.4 F

## 2022-09-22 LAB
HCT VFR BLD CALC: 34 % (ref 36–48)
HEMOGLOBIN: 11.4 G/DL (ref 12–16)
MCH RBC QN AUTO: 31.1 PG (ref 26–34)
MCHC RBC AUTO-ENTMCNC: 33.7 G/DL (ref 31–36)
MCV RBC AUTO: 92.4 FL (ref 80–100)
OCCULT BLOOD SCREENING: NORMAL
PDW BLD-RTO: 14.4 % (ref 12.4–15.4)
PLATELET # BLD: 345 K/UL (ref 135–450)
PMV BLD AUTO: 8.2 FL (ref 5–10.5)
RBC # BLD: 3.68 M/UL (ref 4–5.2)
WBC # BLD: 5.1 K/UL (ref 4–11)

## 2022-09-22 PROCEDURE — 6370000000 HC RX 637 (ALT 250 FOR IP): Performed by: PHYSICAL MEDICINE & REHABILITATION

## 2022-09-22 PROCEDURE — 82270 OCCULT BLOOD FECES: CPT

## 2022-09-22 PROCEDURE — 36415 COLL VENOUS BLD VENIPUNCTURE: CPT

## 2022-09-22 PROCEDURE — 85027 COMPLETE CBC AUTOMATED: CPT

## 2022-09-22 PROCEDURE — 94760 N-INVAS EAR/PLS OXIMETRY 1: CPT

## 2022-09-22 RX ADMIN — FUROSEMIDE 20 MG: 20 TABLET ORAL at 08:09

## 2022-09-22 RX ADMIN — FLUTICASONE PROPIONATE 1 SPRAY: 50 SPRAY, METERED NASAL at 08:10

## 2022-09-22 RX ADMIN — LEVOTHYROXINE SODIUM 100 MCG: 0.1 TABLET ORAL at 06:16

## 2022-09-22 RX ADMIN — Medication 1 TABLET: at 08:09

## 2022-09-22 RX ADMIN — THERA TABS 1 TABLET: TAB at 08:08

## 2022-09-22 NOTE — PROGRESS NOTES
Minerva Santos  9/22/2022  [id number]    Chief Complaint: SBO (small bowel obstruction) ([address]    Subjective:   Patient seen this AM. Patient had CT scan of the abdomen last night that did not reveal any fluid collection or hematoma. Repeat labs today show a big improvement in her H/H level. Patient is noted to have bladder distention, and she is already on Flomax. We will plan to go ahead and discharge her today and the patient will follow-up with her surgeon next week. ROS: No N/C, SOB, chest pain, C/F. Objective:  Patient Vitals for the past 24 hrs:   BP Temp Temp src Pulse Resp SpO2 Height Weight   09/22/22 0502 (!) 157/67 97.4 °F (36.3 °C) Oral 66 18 94 % -- 118 lb 6.2 oz (53.7 kg)   09/21/22 2104 (!) 158/68 97.5 °F (36.4 °C) Oral 71 18 98 % -- --   09/21/22 1458 (!) 148/77 97.5 °F (36.4 °C) Oral 68 16 98 % -- --   09/21/22 0940 -- -- -- -- -- -- 5' 3\" (1.6 m) --       Gen: No distress, pleasant. HEENT: Normocephalic, atraumatic. CV: Regular rate and rhythm. Resp: No respiratory distress. Abd: Soft, nontender   Ext: No edema. Neuro: Alert, oriented, appropriately interactive.      Wt Readings from Last 3 Encounters:   09/22/22 118 lb 6.2 oz (53.7 kg)       Laboratory data:   Lab Results   Component Value Date    WBC 4.9 09/21/2022    HGB 9.8 (L) 09/21/2022    HCT 29.1 (L) 09/21/2022    MCV 93.0 09/21/2022     09/21/2022       Lab Results   Component Value Date/Time     09/20/2022 05:37 AM    K 4.3 09/20/2022 05:37 AM    K 4.6 09/14/2022 08:05 AM     09/20/2022 05:37 AM    CO2 26 09/20/2022 05:37 AM    BUN 25 09/20/2022 05:37 AM    CREATININE 0.7 09/20/2022 05:37 AM    GLUCOSE 91 09/20/2022 05:37 AM    CALCIUM 9.3 09/20/2022 05:37 AM        Therapy progress:  PT  Position Activity Restriction  Other position/activity restrictions: abdominal incision - pt spoke with physician's office, stated she did not need to cover staples for shower  Objective     Sit to Stand: Supervision  Stand to sit: Supervision  Bed to Chair: Supervision  Device: [address]: Stand by assistance  Distance: 200' with 2 turns, [de-identified]' with 4 turns on uneven surface (carpet)  OT  PT Equipment Recommendations  Equipment Needed: No  Toilet - Technique: Ambulating  Equipment Used: Grab bars (comfort height commode)             Body mass index is 20.97 kg/m². Assessment and Plan: Discharge to home today with home therapies. Small Bowel Obstruction - tramadol     Hypertension-monitor     Hypothyroid- synthroid     Asthma -Flonase     glaucoma -Xalatan     Bowels: Schedule Miralax + Senna S. Follow bowel movements. Enema or suppository if needed. Bladder: Check PVR x 3. [facility] if PVR > 200ml or if any volume is > 500 ml. Pain:  Tramadol is ordered prn.          Davi Mittal MD 9/22/2022, 7:39 AM

## 2022-09-22 NOTE — DISCHARGE SUMMARY
mobility but supervision for sit<>supine. Tranfers were completed with supervision except car transfers which was SBA with increased verbal cues required in order to safely complete. Stairs were completed with SBA with reciprocal pattern. Pt became fatigued and emotional following treatment session. Pt has decreased strength and endurance overall and is below her baseline functioning level. She will benefit from skilled intervention in order to address her decreased strength, endurance and functional mobility in order to return safely to home. Occupational therapy:  ,  , Assessment: Pt is a 81 yo female admitted from  after a SBO with surgery. Pt lives alone, was indep with ADL, IADL including driving. She is currently functioning below baseline in above areas. She was able to bathe with mod assist, dress UB with set up, LB with max assist.  Transferred with RW, functional mobility with RW and CGA. Cognition is WFL, UE limited with shoulder flexion with decreased strength and AROM. Anticipate pt will require  approx 1 week inpt rehab with skilled OT services to maximize indep for safe return home with assist PRN and  home OT. Do not anticipate further DME      Inpatient Rehabilitation Course: Sloane Blake is a 80 y.o. female admitted to inpatient rehabilitation on 9/13/2022 for s/p SBO (small bowel obstruction) ([facility] [address] The patient participated in an aggressive multidisciplinary inpatient rehabilitation program involving 3 hours per day, 5 days per week of rehabilitation. Patient is admitted from [facility] hospital after treatment for small bowel obstruction. She is a 63-year-old female with a known history of hypertension, asthma, glaucoma, who presented on 9/5 to [facility] hospital with high-grade mechanical small bowel obstruction. Patient was taken to surgery the same day for exploratory laparotomy and repair of abdominal hernia causing obstruction of her small bowel.   Postop NG tube remains in place, this was removed as her diet was slowly advanced, and now she is tolerating a regular diet. Patient also placed back on Synthroid for her hypothyroidism. At the time of discharge, patient is tolerating oral food and hydration, was ambulating with assistance after she started therapies. Patient needs more therapy before discharge to home safely. Patient is agreeable to rehab unit treatment. After admission to the Rehab Unit, she made steady progress in her therapies as listed above under each therapy section. Her upper and lower extremity coordination and strength did improve in therapy, and she was starting to improve with her endurance and functional status as she  participated in her rehab therapies. Her abdominal pain, balance, labs and blood pressure were monitored while on the rehabilitation unit. Patient seen this AM. Patient had CT scan of the abdomen last night that did not reveal any fluid collection or hematoma. Repeat labs today show a big improvement in her H/H level. Patient is noted to have bladder distention, and she is already on Flomax. We will plan to go ahead and discharge her today and the patient will follow-up with her surgeon and family doctor next week.        Condition at Discharge: Good    Significant Diagnostics:   Lab Results   Component Value Date     09/20/2022    K 4.3 09/20/2022     09/20/2022    BUN 25 (H) 09/20/2022    CREATININE 0.7 09/20/2022    GLUCOSE 91 09/20/2022    CALCIUM 9.3 09/20/2022     Lab Results   Component Value Date    WBC 5.1 09/22/2022    RBC 3.68 (L) 09/22/2022    HGB 11.4 (L) 09/22/2022    HCT 34.0 (L) 09/22/2022    MCV 92.4 09/22/2022    MCH 31.1 09/22/2022    MCHC 33.7 09/22/2022    RDW 14.4 09/22/2022     09/22/2022    MPV 8.2 09/22/2022     No results found for: PROTIME, INR  No results found for: APTT  No results found for: Theone Joshua, [facility], [facility], Jinx Going, BLOODU, PHUR, PROTEINU, [address], LABMICR, URRFLXCULT, URINETYPE  No results found for: MUCUS, [address], EPIU, BACTERIA  No results found for: LABURIN, [employer], LABURIN    CT ABDOMEN PELVIS WO CONTRAST Additional Contrast? Radiologist Recommendation    Result Date: 9/21/2022  EXAMINATION: CT OF THE ABDOMEN AND PELVIS WITHOUT CONTRAST 9/21/2022 5:09 pm TECHNIQUE: CT of the abdomen and pelvis was performed without the administration of intravenous contrast. Multiplanar reformatted images are provided for review. Automated exposure control, iterative reconstruction, and/or weight based adjustment of the mA/kV was utilized to reduce the radiation dose to as low as reasonably achievable. COMPARISON: None. HISTORY: Drop in hemoglobin over past 7 days status post abdominal surgery for small bowel obstruction. FINDINGS: Lower Chest: Small pleural effusions. Linear bands of atelectasis/scarring at the lung bases. Organs: Cholelithiasis. Mild bilateral hydronephrosis. A tiny hyperdense right renal lesion could represent a hemorrhagic cyst.  Remaining solid organs are unremarkable. GI/Bowel: Small hiatal hernia. The stomach is distended with debris. No bowel obstruction or definite acute inflammation. Pelvis: Intense streak artifact limits evaluation. Bladder is markedly distended. The uterus is not well visualized. No lymphadenopathy or free fluid seen. Peritoneum/Retroperitoneum: Minimal ascites. No hematoma or fluid collection. Extensive atherosclerotic disease without abdominal aortic and. Bones/Soft Tissues: Bilateral hip arthroplasties. Dextroconvex curvature of the lumbar spine with multilevel degenerative changes. Grade 1 anterolisthesis of L4 on L5 and L5 on S1 likely due to facet arthropathy. Fatty atrophy of the paraspinal musculature. Anasarca. Midline skin staples in the lower abdomen/pelvis     No hematoma. Findings of fluid overload including small pleural effusions, minimal ascites, and anasarca. Cholelithiasis.   Mild bilateral hydronephrosis could be due to urinary bladder distension. Patient Instructions: Follow-up visits: the patient will follow-up with her surgeon and family doctor next week. Discharge Medications:     Medication List        CONTINUE taking these medications      acetaminophen 325 MG tablet  Commonly known as: TYLENOL  Notes to patient: pain     Biotin 2500 MCG Caps  Notes to patient: Not taking here     CALTRATE 600+D PLUS MINERALS PO  Notes to patient: supplement     fluticasone 50 MCG/ACT nasal spray  Commonly known as: FLONASE  Notes to patient: allergy     latanoprost 0.005 % ophthalmic solution  Commonly known as: XALATAN     levothyroxine 100 MCG tablet  Commonly known as: SYNTHROID  Notes to patient: thryroid     polyethylene glycol 17 GM/SCOOP powder  Commonly known as: GLYCOLAX  Notes to patient: constipation     senna 8.6 MG tablet  Commonly known as: SENOKOT     tamsulosin 0.4 MG capsule  Commonly known as: FLOMAX  Notes to patient: bladder     Theratrum Complete Tabs  Notes to patient: vitamin            STOP taking these medications      heparin (porcine) 5000 UNIT/ML injection     traMADol 50 MG tablet  Commonly known as: ULTRAM                 I spent over 35 minutes on this discharge encounter between counseling, coordination of care, and medication reconciliation.          To comply with Lorenzo BRENNANII.4.1:   Discharge order placed in advance to facilitate patients discharge needs      Ernesta Romberg, MD, 9/22/2022, 2:12 PM

## 2022-09-22 NOTE — PROGRESS NOTES
Plan dc for today does not want Lasix for dc, aware to follow up with PCP about Lasix and swelling, instructed to wear teds, states neighbor will assist with teds.

## 2022-09-22 NOTE — PLAN OF CARE
Problem: Skin/Tissue Integrity  Goal: Absence of new skin breakdown  Description: 1. Monitor for areas of redness and/or skin breakdown  2. Assess vascular access sites hourly  3. Every 4-6 hours minimum:  Change oxygen saturation probe site  4. Every 4-6 hours:  If on nasal continuous positive airway pressure, respiratory therapy assess nares and determine need for appliance change or resting period. Outcome: Progressing  Note: Able to change positions in bed without assist, no evidence of skin breakdown noted. Waffle cushion in place to chair. Problem: Safety - Adult  Goal: Free from fall injury  Outcome: Progressing  Flowsheets (Taken 9/22/2022 0155)  Free From Fall Injury:   Instruct family/caregiver on patient safety   Based on caregiver fall risk screen, instruct family/caregiver to ask for assistance with transferring infant if caregiver noted to have fall risk factors  Note: Pt educated on falls precautions and safety. Call light and personal belongings within reach at all times. Non skid socks in use when up. Hourly rounding and alarms active. Problem: Pain  Goal: Verbalizes/displays adequate comfort level or baseline comfort level  Outcome: Progressing  Flowsheets (Taken 9/22/2022 0155)  Verbalizes/displays adequate comfort level or baseline comfort level:   Encourage patient to monitor pain and request assistance   Administer analgesics based on type and severity of pain and evaluate response   Consider cultural and social influences on pain and pain management  Note: Able to rate pain using a 1-10 scale, medicated per prn orders, see MAR.  Able to verbalize a reduction in pain and/or able to fall asleep and remain asleep without any s/s of pain

## 2022-09-22 NOTE — PLAN OF CARE
Problem: Discharge Planning  Goal: Discharge to home or other facility with appropriate resources  9/22/2022 1339 by Ashley Ward RN  Outcome: Completed  Flowsheets (Taken 9/22/2022 0750)  Discharge to home or other facility with appropriate resources: Identify barriers to discharge with patient and caregiver  9/22/2022 0155 by Ramsey Aly RN  Outcome: Progressing     Problem: Skin/Tissue Integrity  Goal: Absence of new skin breakdown  Description: 1. Monitor for areas of redness and/or skin breakdown  2. Assess vascular access sites hourly  3. Every 4-6 hours minimum:  Change oxygen saturation probe site  4. Every 4-6 hours:  If on nasal continuous positive airway pressure, respiratory therapy assess nares and determine need for appliance change or resting period. 9/22/2022 1339 by Ashley Ward RN  Outcome: Completed  9/22/2022 0155 by Ramsey Aly RN  Outcome: Progressing  Note: Able to change positions in bed without assist, no evidence of skin breakdown noted. Waffle cushion in place to chair. Problem: Safety - Adult  Goal: Free from fall injury  9/22/2022 1339 by Ashley Ward RN  Outcome: Completed  Flowsheets (Taken 9/22/2022 0857)  Free From Fall Injury: Instruct family/caregiver on patient safety  9/22/2022 0155 by Ramsey Aly RN  Outcome: Progressing  Flowsheets (Taken 9/22/2022 0155)  Free From Fall Injury:   Instruct family/caregiver on patient safety   Based on caregiver fall risk screen, instruct family/caregiver to ask for assistance with transferring infant if caregiver noted to have fall risk factors  Note: Pt educated on falls precautions and safety. Call light and personal belongings within reach at all times. Non skid socks in use when up. Hourly rounding and alarms active.        Problem: ABCDS Injury Assessment  Goal: Absence of physical injury  9/22/2022 1339 by Ashley Ward RN  Outcome: Completed  Flowsheets (Taken 9/22/2022 1242)  Absence of Physical Injury: Implement safety measures based on patient assessment  9/22/2022 0155 by Ramsey Aly RN  Outcome: Progressing     Problem: Pain  Goal: Verbalizes/displays adequate comfort level or baseline comfort level  9/22/2022 1339 by Ashley Ward RN  Outcome: Completed  9/22/2022 0155 by Ramsey Aly RN  Outcome: Progressing  Flowsheets (Taken 9/22/2022 0155)  Verbalizes/displays adequate comfort level or baseline comfort level:   Encourage patient to monitor pain and request assistance   Administer analgesics based on type and severity of pain and evaluate response   Consider cultural and social influences on pain and pain management  Note: Able to rate pain using a 1-10 scale, medicated per prn orders, see MAR. Able to verbalize a reduction in pain and/or able to fall asleep and remain asleep without any s/s of pain       Problem: Discharge Planning  Goal: Discharge to home or other facility with appropriate resources  9/22/2022 1339 by Ashley Ward RN  Outcome: Completed  Flowsheets (Taken 9/22/2022 0750)  Discharge to home or other facility with appropriate resources: Identify barriers to discharge with patient and caregiver  9/22/2022 0155 by Ramsey Aly RN  Outcome: Progressing     Problem: Skin/Tissue Integrity  Goal: Absence of new skin breakdown  Description: 1. Monitor for areas of redness and/or skin breakdown  2. Assess vascular access sites hourly  3. Every 4-6 hours minimum:  Change oxygen saturation probe site  4. Every 4-6 hours:  If on nasal continuous positive airway pressure, respiratory therapy assess nares and determine need for appliance change or resting period. 9/22/2022 1339 by Ashley Ward RN  Outcome: Completed  9/22/2022 0155 by Ramsey Aly RN  Outcome: Progressing  Note: Able to change positions in bed without assist, no evidence of skin breakdown noted. Waffle cushion in place to chair.        Problem: Safety - Adult  Goal: Free from fall injury  9/22/2022 1339 by Margot Phipps RN  Outcome: Completed  Flowsheets (Taken 9/22/2022 0857)  Free From Fall Injury: Instruct family/caregiver on patient safety  9/22/2022 0155 by Denis Craft RN  Outcome: Progressing  Flowsheets (Taken 9/22/2022 0155)  Free From Fall Injury:   Instruct family/caregiver on patient safety   Based on caregiver fall risk screen, instruct family/caregiver to ask for assistance with transferring infant if caregiver noted to have fall risk factors  Note: Pt educated on falls precautions and safety. Call light and personal belongings within reach at all times. Non skid socks in use when up. Hourly rounding and alarms active. Problem: ABCDS Injury Assessment  Goal: Absence of physical injury  9/22/2022 1339 by Margot Phipps RN  Outcome: Completed  Flowsheets (Taken 9/22/2022 7213)  Absence of Physical Injury: Implement safety measures based on patient assessment  9/22/2022 0155 by Denis Craft RN  Outcome: Progressing     Problem: Pain  Goal: Verbalizes/displays adequate comfort level or baseline comfort level  9/22/2022 1339 by Margot Phipps RN  Outcome: Completed  9/22/2022 0155 by Denis Craft RN  Outcome: Progressing  Flowsheets (Taken 9/22/2022 0155)  Verbalizes/displays adequate comfort level or baseline comfort level:   Encourage patient to monitor pain and request assistance   Administer analgesics based on type and severity of pain and evaluate response   Consider cultural and social influences on pain and pain management  Note: Able to rate pain using a 1-10 scale, medicated per prn orders, see MAR.  Able to verbalize a reduction in pain and/or able to fall asleep and remain asleep without any s/s of pain

## 2022-09-22 NOTE — PROGRESS NOTES
Already eating in bed, declined chair, reviewed swelling in feet and legs states not new, abd firm, patient thinks it is the same, denies issues with bladder. Aware need stool sample.

## 2022-09-22 NOTE — PROGRESS NOTES
Pt up to the bathroom to void. Returned to bed and bladder scanned for >999mls. Pt denies any abdominal discomfort or feelings of fullness. Attempted to straight cath patient but only able to drain 25mls. Bladder scan continues to show > 999. No clot or blockage noted on tubing. Pt tolerated well. Will notify MD in the morning.  Javan Cordova RN

## 2022-09-22 NOTE — PROGRESS NOTES
Dr. Michelle Castaneda here and updated on abd, (firmness) and bladder scan and straight cath during the night.

## 2022-09-22 NOTE — PROGRESS NOTES
Resting quietly in bed at this time. Pt admitted with debility s/p small bowel obstruction requiring ex lap. Surgical incision to abdomen GENE with staples in place. Transferring with a cane x1, SBA. Tolerating well. Lungs clear but diminished. HR regular. Abdomen non tender with active bowel sounds. Can be incontinent of urine. Pt with +2 pitting BLE edema, started on lasix yesterday. Denies pain. Encouraged to call for all needs. Call light remains in reach at all times.  Miguel Vail RN

## 2024-02-13 ENCOUNTER — APPOINTMENT (OUTPATIENT)
Dept: GENERAL RADIOLOGY | Age: 89
DRG: 689 | End: 2024-02-13
Payer: MEDICARE

## 2024-02-13 ENCOUNTER — HOSPITAL ENCOUNTER (INPATIENT)
Age: 89
LOS: 6 days | Discharge: NURSING, REHAB OR LONG-TERM CARE | DRG: 689 | End: 2024-02-19
Attending: INTERNAL MEDICINE | Admitting: INTERNAL MEDICINE
Payer: MEDICARE

## 2024-02-13 DIAGNOSIS — M25.552 LEFT HIP PAIN: ICD-10-CM

## 2024-02-13 DIAGNOSIS — R26.2 INABILITY TO WALK: ICD-10-CM

## 2024-02-13 DIAGNOSIS — N39.0 URINARY TRACT INFECTION WITHOUT HEMATURIA, SITE UNSPECIFIED: Primary | ICD-10-CM

## 2024-02-13 LAB
ALBUMIN SERPL-MCNC: 3.7 G/DL (ref 3.4–5)
ALBUMIN/GLOB SERPL: 1.7 {RATIO} (ref 1.1–2.2)
ALP SERPL-CCNC: 91 U/L (ref 40–129)
ALT SERPL-CCNC: 39 U/L (ref 10–40)
ANION GAP SERPL CALCULATED.3IONS-SCNC: 7 MMOL/L (ref 3–16)
AST SERPL-CCNC: 44 U/L (ref 15–37)
BACTERIA URNS QL MICRO: ABNORMAL /HPF
BASOPHILS # BLD: 0 K/UL (ref 0–0.2)
BASOPHILS NFR BLD: 0.7 %
BILIRUB SERPL-MCNC: 0.3 MG/DL (ref 0–1)
BILIRUB UR QL STRIP.AUTO: NEGATIVE
BUN SERPL-MCNC: 40 MG/DL (ref 7–20)
CALCIUM SERPL-MCNC: 10.3 MG/DL (ref 8.3–10.6)
CHLORIDE SERPL-SCNC: 102 MMOL/L (ref 99–110)
CLARITY UR: CLEAR
CO2 SERPL-SCNC: 31 MMOL/L (ref 21–32)
COLOR UR: ABNORMAL
CREAT SERPL-MCNC: 1 MG/DL (ref 0.6–1.2)
DEPRECATED RDW RBC AUTO: 14.2 % (ref 12.4–15.4)
EOSINOPHIL # BLD: 0 K/UL (ref 0–0.6)
EOSINOPHIL NFR BLD: 0.5 %
EPI CELLS #/AREA URNS AUTO: 1 /HPF (ref 0–5)
FLUAV RNA UPPER RESP QL NAA+PROBE: NEGATIVE
FLUBV AG NPH QL: NEGATIVE
GFR SERPLBLD CREATININE-BSD FMLA CKD-EPI: 52 ML/MIN/{1.73_M2}
GLUCOSE SERPL-MCNC: 86 MG/DL (ref 70–99)
GLUCOSE UR STRIP.AUTO-MCNC: NEGATIVE MG/DL
HCT VFR BLD AUTO: 41.7 % (ref 36–48)
HGB BLD-MCNC: 14.1 G/DL (ref 12–16)
HGB UR QL STRIP.AUTO: ABNORMAL
HYALINE CASTS #/AREA URNS AUTO: 1 /LPF (ref 0–8)
KETONES UR STRIP.AUTO-MCNC: NEGATIVE MG/DL
LEUKOCYTE ESTERASE UR QL STRIP.AUTO: ABNORMAL
LYMPHOCYTES # BLD: 0.7 K/UL (ref 1–5.1)
LYMPHOCYTES NFR BLD: 11.9 %
MCH RBC QN AUTO: 31.9 PG (ref 26–34)
MCHC RBC AUTO-ENTMCNC: 33.7 G/DL (ref 31–36)
MCV RBC AUTO: 94.8 FL (ref 80–100)
MONOCYTES # BLD: 0.4 K/UL (ref 0–1.3)
MONOCYTES NFR BLD: 7 %
NEUTROPHILS # BLD: 4.7 K/UL (ref 1.7–7.7)
NEUTROPHILS NFR BLD: 79.9 %
NITRITE UR QL STRIP.AUTO: POSITIVE
PH UR STRIP.AUTO: 7 [PH] (ref 5–8)
PLATELET # BLD AUTO: 200 K/UL (ref 135–450)
PMV BLD AUTO: 9.3 FL (ref 5–10.5)
POTASSIUM SERPL-SCNC: 4.5 MMOL/L (ref 3.5–5.1)
PROT SERPL-MCNC: 5.9 G/DL (ref 6.4–8.2)
PROT UR STRIP.AUTO-MCNC: ABNORMAL MG/DL
RBC # BLD AUTO: 4.4 M/UL (ref 4–5.2)
RBC CLUMPS #/AREA URNS AUTO: 1 /HPF (ref 0–4)
SARS-COV-2 RDRP RESP QL NAA+PROBE: NOT DETECTED
SODIUM SERPL-SCNC: 140 MMOL/L (ref 136–145)
SP GR UR STRIP.AUTO: 1.01 (ref 1–1.03)
UA COMPLETE W REFLEX CULTURE PNL UR: ABNORMAL
UA DIPSTICK W REFLEX MICRO PNL UR: YES
URN SPEC COLLECT METH UR: ABNORMAL
UROBILINOGEN UR STRIP-ACNC: 1 E.U./DL
WBC # BLD AUTO: 5.9 K/UL (ref 4–11)
WBC #/AREA URNS AUTO: 9 /HPF (ref 0–5)

## 2024-02-13 PROCEDURE — 87635 SARS-COV-2 COVID-19 AMP PRB: CPT

## 2024-02-13 PROCEDURE — 73502 X-RAY EXAM HIP UNI 2-3 VIEWS: CPT

## 2024-02-13 PROCEDURE — 6360000002 HC RX W HCPCS: Performed by: PHYSICIAN ASSISTANT

## 2024-02-13 PROCEDURE — 2580000003 HC RX 258: Performed by: PHYSICIAN ASSISTANT

## 2024-02-13 PROCEDURE — 71045 X-RAY EXAM CHEST 1 VIEW: CPT

## 2024-02-13 PROCEDURE — 99285 EMERGENCY DEPT VISIT HI MDM: CPT

## 2024-02-13 PROCEDURE — 80053 COMPREHEN METABOLIC PANEL: CPT

## 2024-02-13 PROCEDURE — 85025 COMPLETE CBC W/AUTO DIFF WBC: CPT

## 2024-02-13 PROCEDURE — 96374 THER/PROPH/DIAG INJ IV PUSH: CPT

## 2024-02-13 PROCEDURE — 1200000000 HC SEMI PRIVATE

## 2024-02-13 PROCEDURE — 81001 URINALYSIS AUTO W/SCOPE: CPT

## 2024-02-13 PROCEDURE — 6370000000 HC RX 637 (ALT 250 FOR IP): Performed by: PHYSICIAN ASSISTANT

## 2024-02-13 PROCEDURE — 87804 INFLUENZA ASSAY W/OPTIC: CPT

## 2024-02-13 RX ORDER — MAGNESIUM SULFATE IN WATER 40 MG/ML
2000 INJECTION, SOLUTION INTRAVENOUS PRN
Status: DISCONTINUED | OUTPATIENT
Start: 2024-02-13 | End: 2024-02-13

## 2024-02-13 RX ORDER — ONDANSETRON 4 MG/1
4 TABLET, ORALLY DISINTEGRATING ORAL EVERY 8 HOURS PRN
Status: DISCONTINUED | OUTPATIENT
Start: 2024-02-13 | End: 2024-02-19 | Stop reason: HOSPADM

## 2024-02-13 RX ORDER — SODIUM CHLORIDE 9 MG/ML
INJECTION, SOLUTION INTRAVENOUS PRN
Status: DISCONTINUED | OUTPATIENT
Start: 2024-02-13 | End: 2024-02-19 | Stop reason: HOSPADM

## 2024-02-13 RX ORDER — ACETAMINOPHEN 325 MG/1
975 TABLET ORAL EVERY 8 HOURS PRN
Status: DISCONTINUED | OUTPATIENT
Start: 2024-02-13 | End: 2024-02-15 | Stop reason: SDUPTHER

## 2024-02-13 RX ORDER — SODIUM CHLORIDE 0.9 % (FLUSH) 0.9 %
5-40 SYRINGE (ML) INJECTION EVERY 12 HOURS SCHEDULED
Status: DISCONTINUED | OUTPATIENT
Start: 2024-02-14 | End: 2024-02-19 | Stop reason: HOSPADM

## 2024-02-13 RX ORDER — ONDANSETRON 2 MG/ML
4 INJECTION INTRAMUSCULAR; INTRAVENOUS EVERY 6 HOURS PRN
Status: DISCONTINUED | OUTPATIENT
Start: 2024-02-13 | End: 2024-02-19 | Stop reason: HOSPADM

## 2024-02-13 RX ORDER — SODIUM CHLORIDE 0.9 % (FLUSH) 0.9 %
5-40 SYRINGE (ML) INJECTION PRN
Status: DISCONTINUED | OUTPATIENT
Start: 2024-02-13 | End: 2024-02-19 | Stop reason: HOSPADM

## 2024-02-13 RX ORDER — LEVOTHYROXINE SODIUM 0.05 MG/1
50 TABLET ORAL
Status: DISCONTINUED | OUTPATIENT
Start: 2024-02-14 | End: 2024-02-19 | Stop reason: HOSPADM

## 2024-02-13 RX ORDER — ENOXAPARIN SODIUM 100 MG/ML
40 INJECTION SUBCUTANEOUS DAILY
Status: DISCONTINUED | OUTPATIENT
Start: 2024-02-14 | End: 2024-02-13

## 2024-02-13 RX ORDER — POLYETHYLENE GLYCOL 3350 17 G/17G
17 POWDER, FOR SOLUTION ORAL DAILY PRN
Status: DISCONTINUED | OUTPATIENT
Start: 2024-02-13 | End: 2024-02-19 | Stop reason: HOSPADM

## 2024-02-13 RX ORDER — POTASSIUM CHLORIDE 7.45 MG/ML
10 INJECTION INTRAVENOUS PRN
Status: DISCONTINUED | OUTPATIENT
Start: 2024-02-13 | End: 2024-02-13

## 2024-02-13 RX ORDER — POTASSIUM CHLORIDE 20 MEQ/1
40 TABLET, EXTENDED RELEASE ORAL PRN
Status: DISCONTINUED | OUTPATIENT
Start: 2024-02-13 | End: 2024-02-13

## 2024-02-13 RX ORDER — ACETAMINOPHEN 325 MG/1
650 TABLET ORAL EVERY 6 HOURS PRN
Status: DISCONTINUED | OUTPATIENT
Start: 2024-02-13 | End: 2024-02-19 | Stop reason: HOSPADM

## 2024-02-13 RX ORDER — SODIUM CHLORIDE, SODIUM LACTATE, POTASSIUM CHLORIDE, CALCIUM CHLORIDE 600; 310; 30; 20 MG/100ML; MG/100ML; MG/100ML; MG/100ML
INJECTION, SOLUTION INTRAVENOUS CONTINUOUS
Status: ACTIVE | OUTPATIENT
Start: 2024-02-14 | End: 2024-02-15

## 2024-02-13 RX ORDER — ACETAMINOPHEN 650 MG/1
650 SUPPOSITORY RECTAL EVERY 6 HOURS PRN
Status: DISCONTINUED | OUTPATIENT
Start: 2024-02-13 | End: 2024-02-19 | Stop reason: HOSPADM

## 2024-02-13 RX ORDER — DOXYCYCLINE HYCLATE 100 MG/1
100 CAPSULE ORAL 2 TIMES DAILY
Status: ON HOLD | COMMUNITY
Start: 2024-02-05 | End: 2024-02-19 | Stop reason: HOSPADM

## 2024-02-13 RX ORDER — ENOXAPARIN SODIUM 100 MG/ML
30 INJECTION SUBCUTANEOUS DAILY
Status: DISCONTINUED | OUTPATIENT
Start: 2024-02-14 | End: 2024-02-15 | Stop reason: ALTCHOICE

## 2024-02-13 RX ORDER — FLUTICASONE PROPIONATE 50 MCG
1 SPRAY, SUSPENSION (ML) NASAL DAILY PRN
Status: DISCONTINUED | OUTPATIENT
Start: 2024-02-13 | End: 2024-02-19 | Stop reason: HOSPADM

## 2024-02-13 RX ORDER — ACETAMINOPHEN 500 MG
1000 TABLET ORAL
Status: COMPLETED | OUTPATIENT
Start: 2024-02-13 | End: 2024-02-13

## 2024-02-13 RX ORDER — LATANOPROST 50 UG/ML
1 SOLUTION/ DROPS OPHTHALMIC NIGHTLY
Status: DISCONTINUED | OUTPATIENT
Start: 2024-02-14 | End: 2024-02-19 | Stop reason: HOSPADM

## 2024-02-13 RX ADMIN — WATER 1000 MG: 1 INJECTION INTRAMUSCULAR; INTRAVENOUS; SUBCUTANEOUS at 20:22

## 2024-02-13 RX ADMIN — ACETAMINOPHEN 1000 MG: 500 TABLET ORAL at 17:36

## 2024-02-13 ASSESSMENT — PAIN SCALES - GENERAL
PAINLEVEL_OUTOF10: 8
PAINLEVEL_OUTOF10: 0
PAINLEVEL_OUTOF10: 8

## 2024-02-13 ASSESSMENT — PAIN DESCRIPTION - PAIN TYPE: TYPE: ACUTE PAIN

## 2024-02-13 ASSESSMENT — PAIN DESCRIPTION - DESCRIPTORS: DESCRIPTORS: ACHING

## 2024-02-13 ASSESSMENT — PAIN DESCRIPTION - LOCATION: LOCATION: HIP

## 2024-02-13 ASSESSMENT — PAIN DESCRIPTION - FREQUENCY: FREQUENCY: CONTINUOUS

## 2024-02-13 ASSESSMENT — PAIN DESCRIPTION - ONSET: ONSET: ON-GOING

## 2024-02-13 ASSESSMENT — PAIN DESCRIPTION - ORIENTATION: ORIENTATION: LEFT

## 2024-02-13 NOTE — ED PROVIDER NOTES
[facility] EMERGENCY DEPARTMENT  EMERGENCY DEPARTMENT ENCOUNTER        Pt Name: Ambar Larson  MRN: [id number]  Birthdate 1/10/1928  Date of evaluation: 2/13/2024  Provider: Ibrahima Washington PA-C  PCP: Rebekah Vargas MD  Note Started: 5:34 PM EST 2/13/24      TIAN. I have evaluated this patient.        CHIEF COMPLAINT       Chief Complaint   Patient presents with    Hip Pain     Pt reports left hip pain \"for the last couple of days\" that \"worsened today\". Daughter reports that pt hasn't been moving around as well today as normal. Pt alert and oriented at this time with no signs of distress noted. Pt rates pain as a 8/10.       HISTORY OF PRESENT ILLNESS: 1 or more Elements     History From: patient, pt's daughter at bedside  Limitations to history : Altered Mental Status    Ambar Larson is a 89 y.o. female who presents with left hip pain, worsening weakness and confusion.  Patient complains of worsening left hip pain over the past 2 weeks, worse with walking and sitting up and standing down.  Denies any trauma, thinks she has a total hip arthroplasty on 1 hip, cannot remember which 1.  Patient's daughter is concerned because patient lives alone and has been more confused over the last week.  Today patient was able to stand or ambulate without significant assistance.    Nursing Notes were all reviewed and agreed with or any disagreements were addressed in the HPI.    REVIEW OF SYSTEMS :      Review of Systems   Unable to perform ROS: Mental status change   All other systems reviewed and are negative.      Positives and Pertinent negatives as per HPI.       PAST MEDICAL HISTORY    has no past medical history on file.     SURGICAL HISTORY   History reviewed. No pertinent surgical history.    CURRENTMEDICATIONS       Previous Medications    ACETAMINOPHEN (TYLENOL) 325 MG TABLET    Take 3 tablets by mouth every 8 hours as needed for Pain    BIOTIN 2500 MCG CAPS    Take by mouth    CALCIUM

## 2024-02-14 LAB
ALBUMIN SERPL-MCNC: 3.2 G/DL (ref 3.4–5)
ALBUMIN/GLOB SERPL: 1.8 {RATIO} (ref 1.1–2.2)
ALP SERPL-CCNC: 75 U/L (ref 40–129)
ALT SERPL-CCNC: 29 U/L (ref 10–40)
ANION GAP SERPL CALCULATED.3IONS-SCNC: 4 MMOL/L (ref 3–16)
AST SERPL-CCNC: 32 U/L (ref 15–37)
BASOPHILS # BLD: 0.1 K/UL (ref 0–0.2)
BASOPHILS NFR BLD: 2.2 %
BILIRUB SERPL-MCNC: <0.2 MG/DL (ref 0–1)
BUN SERPL-MCNC: 39 MG/DL (ref 7–20)
CALCIUM SERPL-MCNC: 9.2 MG/DL (ref 8.3–10.6)
CHLORIDE SERPL-SCNC: 107 MMOL/L (ref 99–110)
CO2 SERPL-SCNC: 33 MMOL/L (ref 21–32)
CREAT SERPL-MCNC: 0.9 MG/DL (ref 0.6–1.2)
DEPRECATED RDW RBC AUTO: 14.1 % (ref 12.4–15.4)
EOSINOPHIL # BLD: 0.1 K/UL (ref 0–0.6)
EOSINOPHIL NFR BLD: 2.8 %
GFR SERPLBLD CREATININE-BSD FMLA CKD-EPI: 58 ML/MIN/{1.73_M2}
GLUCOSE SERPL-MCNC: 71 MG/DL (ref 70–99)
HCT VFR BLD AUTO: 38.1 % (ref 36–48)
HGB BLD-MCNC: 12.8 G/DL (ref 12–16)
LYMPHOCYTES # BLD: 0.9 K/UL (ref 1–5.1)
LYMPHOCYTES NFR BLD: 19.8 %
MCH RBC QN AUTO: 31.6 PG (ref 26–34)
MCHC RBC AUTO-ENTMCNC: 33.7 G/DL (ref 31–36)
MCV RBC AUTO: 94 FL (ref 80–100)
MONOCYTES # BLD: 0.5 K/UL (ref 0–1.3)
MONOCYTES NFR BLD: 9.8 %
NEUTROPHILS # BLD: 3.1 K/UL (ref 1.7–7.7)
NEUTROPHILS NFR BLD: 65.4 %
PLATELET # BLD AUTO: 186 K/UL (ref 135–450)
PLATELET BLD QL SMEAR: ADEQUATE
PMV BLD AUTO: 9.4 FL (ref 5–10.5)
POTASSIUM SERPL-SCNC: 3.9 MMOL/L (ref 3.5–5.1)
PROT SERPL-MCNC: 5 G/DL (ref 6.4–8.2)
RBC # BLD AUTO: 4.06 M/UL (ref 4–5.2)
SLIDE REVIEW: ABNORMAL
SODIUM SERPL-SCNC: 144 MMOL/L (ref 136–145)
WBC # BLD AUTO: 4.8 K/UL (ref 4–11)

## 2024-02-14 PROCEDURE — 97162 PT EVAL MOD COMPLEX 30 MIN: CPT

## 2024-02-14 PROCEDURE — 87186 SC STD MICRODIL/AGAR DIL: CPT

## 2024-02-14 PROCEDURE — 85025 COMPLETE CBC W/AUTO DIFF WBC: CPT

## 2024-02-14 PROCEDURE — 87184 SC STD DISK METHOD PER PLATE: CPT

## 2024-02-14 PROCEDURE — 6360000002 HC RX W HCPCS: Performed by: INTERNAL MEDICINE

## 2024-02-14 PROCEDURE — 36415 COLL VENOUS BLD VENIPUNCTURE: CPT

## 2024-02-14 PROCEDURE — 6370000000 HC RX 637 (ALT 250 FOR IP): Performed by: INTERNAL MEDICINE

## 2024-02-14 PROCEDURE — 1200000000 HC SEMI PRIVATE

## 2024-02-14 PROCEDURE — 94760 N-INVAS EAR/PLS OXIMETRY 1: CPT

## 2024-02-14 PROCEDURE — 97530 THERAPEUTIC ACTIVITIES: CPT

## 2024-02-14 PROCEDURE — 97535 SELF CARE MNGMENT TRAINING: CPT

## 2024-02-14 PROCEDURE — 80053 COMPREHEN METABOLIC PANEL: CPT

## 2024-02-14 PROCEDURE — 87086 URINE CULTURE/COLONY COUNT: CPT

## 2024-02-14 PROCEDURE — 97166 OT EVAL MOD COMPLEX 45 MIN: CPT

## 2024-02-14 PROCEDURE — 2580000003 HC RX 258: Performed by: INTERNAL MEDICINE

## 2024-02-14 PROCEDURE — 87077 CULTURE AEROBIC IDENTIFY: CPT

## 2024-02-14 RX ADMIN — LATANOPROST 1 DROP: 50 SOLUTION OPHTHALMIC at 00:50

## 2024-02-14 RX ADMIN — Medication 10 ML: at 20:34

## 2024-02-14 RX ADMIN — LATANOPROST 1 DROP: 50 SOLUTION OPHTHALMIC at 20:31

## 2024-02-14 RX ADMIN — SODIUM CHLORIDE, POTASSIUM CHLORIDE, SODIUM LACTATE AND CALCIUM CHLORIDE: 600; 310; 30; 20 INJECTION, SOLUTION INTRAVENOUS at 00:15

## 2024-02-14 RX ADMIN — SODIUM CHLORIDE, POTASSIUM CHLORIDE, SODIUM LACTATE AND CALCIUM CHLORIDE: 600; 310; 30; 20 INJECTION, SOLUTION INTRAVENOUS at 20:31

## 2024-02-14 RX ADMIN — WATER 1000 MG: 1 INJECTION INTRAMUSCULAR; INTRAVENOUS; SUBCUTANEOUS at 20:25

## 2024-02-14 RX ADMIN — LEVOTHYROXINE SODIUM 50 MCG: 0.05 TABLET ORAL at 06:44

## 2024-02-14 RX ADMIN — ENOXAPARIN SODIUM 30 MG: 100 INJECTION SUBCUTANEOUS at 09:54

## 2024-02-14 ASSESSMENT — PAIN SCALES - GENERAL
PAINLEVEL_OUTOF10: 0

## 2024-02-14 NOTE — CARE COORDINATION
Case Management Assessment  Initial Evaluation    Date/Time of Evaluation: 2/14/2024 11:11 AM  Assessment Completed by: Melania Wong RN    If patient is discharged prior to next notation, then this note serves as note for discharge by case management.    Patient Name: Ambar Larson                   YOB: 1928  Diagnosis: UTI (urinary tract infection) [N39.0]  Left hip pain [M25.552]  Inability to walk [R26.2]  Urinary tract infection without hematuria, site unspecified [N39.0]                   Date / Time: 2/13/2024  5:45 PM    Patient Admission Status: Inpatient   Readmission Risk (Low < 19, Mod (19-27), High > 27): Readmission Risk Score: 12.3    Current PCP: Rebekah Vargas MD  PCP verified by CM? Yes    Chart Reviewed: Yes      History Provided by: Patient  Patient Orientation: Alert and Oriented    Patient Cognition: Alert    Hospitalization in the last 30 days (Readmission):  No    If yes, Readmission Assessment in CM Navigator will be completed.    Advance Directives:      Code Status: Full Code   Patient's Primary Decision Maker is: Legal Next of Kin      Discharge Planning:    Patient lives with: Alone Type of Home: Other (Comment) (Condominium)  Primary Care Giver: Self  Patient Support Systems include: Family Members   Current Financial resources: Medicare  Current community resources: Other (Comment) (Private Pay )  Current services prior to admission: Durable Medical Equipment            Current DME: Walker, Other (Comment) (Built-in shower chair and grab bar)            Type of Home Care services:  None    ADLS  Prior functional level: Assistance with the following:, Mobility, Housework, Shopping  Current functional level: Assistance with the following:, Housework, Shopping, Mobility    PT AM-PAC: 17 /24  OT AM-PAC: 17 /24    Family can provide assistance at DC: Other (comment) (Will need to check with the niece and great nephew.)  Would you like Case Management to

## 2024-02-14 NOTE — H&P
MD Angel   fluticasone (FLONASE) 50 MCG/ACT nasal spray 1 spray by Each Nostril route as needed for Rhinitis    ProviderAngel MD   levothyroxine (SYNTHROID) 100 MCG tablet Take 0.5 tablets by mouth every morning (before breakfast)    ProviderAngel MD   Multiple Vitamins-Minerals (THERATRUM COMPLETE) TABS Take 1 tablet by mouth daily    ProviderAngel MD       Allergies:  Sulfa antibiotics and Aspirin    Social History:      TOBACCO:   reports that she has never smoked. She has never used smokeless tobacco.  ETOH:   reports no history of alcohol use.  DRUGS:  reports no history of drug use.    Family History:      Reviewed in detail positive as follows:    History reviewed. No pertinent family history.    REVIEW OF SYSTEMS:   Pertinent positives as noted in the HPI. All other systems reviewed and negative.    PHYSICAL EXAM PERFORMED:    BP (!) 165/72   Pulse 54   Temp 97.3 °F (36.3 °C) (Oral)   Resp 11   SpO2 96%     General appearance:  Awake, alert, no apparent distress  HEENT:  Normocephalic, atraumatic   Neck: Supple,  Respiratory:  Clear to auscultation bilaterally  Cardiovascular:  Regular rate and rhythm without murmurs, rubs or gallops.   Abdomen: Soft, NT, ND, without rebound or guarding. Normal bowel sounds.  Extremities:  No clubbing, cyanosis, or edema bilaterally.  Full range of motion without deformity. +2 palpable pulses, equal bilaterally.   Neurologic:  grossly non-focal. Alert and oriented x 3. Normal speech.  Psychiatric:  Thought content appropriate, normal insight.    Labs:   CBC   Recent Labs     02/13/24  1747   WBC 5.9   HGB 14.1   HCT 41.7           RENAL  Recent Labs     02/13/24  1747      K 4.5      CO2 31   BUN 40*   CREATININE 1.0       LFTS  Recent Labs     02/13/24  1747   AST 44*   ALT 39   BILITOT 0.3   ALKPHOS 91           Radiology:     XR HIP 2-3 VW W PELVIS LEFT   Final Result   1. Stable appearance of bilateral bipolar hip

## 2024-02-14 NOTE — ED NOTES
ED SBAR report provider to JOAQUIM Paiz. Patient to be transported to Room 4258 via stretcher by transport tech.Patient transported with bedside cardiac monitor. IV site clean, dry, and intact. Updated patient and family on plan of care.      Will place transport once bed is clean.

## 2024-02-14 NOTE — ED NOTES
Straight cath performed using sterile technique. Pt tolerated well. Urine return noted and specimen sent to lab for analysis.

## 2024-02-15 PROCEDURE — 6370000000 HC RX 637 (ALT 250 FOR IP): Performed by: NURSE PRACTITIONER

## 2024-02-15 PROCEDURE — 1200000000 HC SEMI PRIVATE

## 2024-02-15 PROCEDURE — 94760 N-INVAS EAR/PLS OXIMETRY 1: CPT

## 2024-02-15 PROCEDURE — 6370000000 HC RX 637 (ALT 250 FOR IP): Performed by: INTERNAL MEDICINE

## 2024-02-15 PROCEDURE — 2580000003 HC RX 258: Performed by: INTERNAL MEDICINE

## 2024-02-15 PROCEDURE — 97530 THERAPEUTIC ACTIVITIES: CPT

## 2024-02-15 PROCEDURE — 97535 SELF CARE MNGMENT TRAINING: CPT

## 2024-02-15 PROCEDURE — 6360000002 HC RX W HCPCS: Performed by: INTERNAL MEDICINE

## 2024-02-15 RX ORDER — DOCUSATE SODIUM 100 MG/1
100 CAPSULE, LIQUID FILLED ORAL 2 TIMES DAILY
Status: DISCONTINUED | OUTPATIENT
Start: 2024-02-15 | End: 2024-02-19 | Stop reason: HOSPADM

## 2024-02-15 RX ORDER — SENNOSIDES A AND B 8.6 MG/1
1 TABLET, FILM COATED ORAL 2 TIMES DAILY
Status: DISCONTINUED | OUTPATIENT
Start: 2024-02-15 | End: 2024-02-19 | Stop reason: HOSPADM

## 2024-02-15 RX ORDER — HEPARIN SODIUM 5000 [USP'U]/ML
5000 INJECTION, SOLUTION INTRAVENOUS; SUBCUTANEOUS 2 TIMES DAILY
Status: DISCONTINUED | OUTPATIENT
Start: 2024-02-16 | End: 2024-02-18 | Stop reason: ALTCHOICE

## 2024-02-15 RX ADMIN — DOCUSATE SODIUM 100 MG: 100 CAPSULE, LIQUID FILLED ORAL at 20:45

## 2024-02-15 RX ADMIN — SENNOSIDES 8.6 MG: 8.6 TABLET, FILM COATED ORAL at 20:45

## 2024-02-15 RX ADMIN — DOCUSATE SODIUM 100 MG: 100 CAPSULE, LIQUID FILLED ORAL at 14:09

## 2024-02-15 RX ADMIN — LEVOTHYROXINE SODIUM 50 MCG: 0.05 TABLET ORAL at 05:33

## 2024-02-15 RX ADMIN — Medication 10 ML: at 20:46

## 2024-02-15 RX ADMIN — LATANOPROST 1 DROP: 50 SOLUTION OPHTHALMIC at 21:44

## 2024-02-15 RX ADMIN — SENNOSIDES 8.6 MG: 8.6 TABLET, FILM COATED ORAL at 14:09

## 2024-02-15 RX ADMIN — ENOXAPARIN SODIUM 30 MG: 100 INJECTION SUBCUTANEOUS at 10:05

## 2024-02-15 RX ADMIN — WATER 1000 MG: 1 INJECTION INTRAMUSCULAR; INTRAVENOUS; SUBCUTANEOUS at 20:46

## 2024-02-15 RX ADMIN — SODIUM CHLORIDE, POTASSIUM CHLORIDE, SODIUM LACTATE AND CALCIUM CHLORIDE: 600; 310; 30; 20 INJECTION, SOLUTION INTRAVENOUS at 15:15

## 2024-02-15 ASSESSMENT — PAIN SCALES - GENERAL
PAINLEVEL_OUTOF10: 0

## 2024-02-15 NOTE — CARE COORDINATION
[facility] Contin[facility] Nurse  Consult Note       NAME:  Ambar Larson  MEDICAL RECORD NUMBER:  [id number]  AGE: 89 y.o.   GENDER: female  : 1/10/1928  TODAY'S DATE:  2/15/2024    Subjective   Reason for WOCN Evaluation and Assessment: LLE wound and right foot wound      Ambar Larson is a 89 y.o. female referred by:   [] Physician  [x] Nursing  [] Other:     Wound Identification:  Wound Type: undetermined->suspect skin tear; pt unable to give detailed history  Contributing Factors: none    Wound History: noted on admit. Pt tells me she sees a podiatrist and he trimmed her nails  Current Wound Care Treatment:  GENE    Patient Goal of Care:  [x] Wound Healing  [] Odor Control  [] Palliative Care  [] Pain Control   [] Other:         PAST MEDICAL HISTORY    History reviewed. No pertinent past medical history.    PAST SURGICAL HISTORY    History reviewed. No pertinent surgical history.    FAMILY HISTORY    History reviewed. No pertinent family history.    SOCIAL HISTORY    Social History     Tobacco Use    Smoking status: Never    Smokeless tobacco: Never   Vaping Use    Vaping Use: Never used   Substance Use Topics    Alcohol use: Never    Drug use: Never       ALLERGIES    Allergies   Allergen Reactions    Sulfa Antibiotics Shortness Of Breath    Aspirin      Maybe sinus disease?       MEDICATIONS    No current facility-administered medications on file prior to encounter.     Current Outpatient Medications on File Prior to Encounter   Medication Sig Dispense Refill    acetaminophen (TYLENOL) 325 MG tablet Take 3 tablets by mouth every 8 hours as needed for Pain      latanoprost (XALATAN) 0.005 % ophthalmic solution Place 1 drop into both eyes nightly      Biotin 2500 MCG CAPS Take by mouth (Patient not taking: Reported on 2024)      Calcium Carbonate-Vit D-Min (CALTRATE 600+D PLUS MINERALS PO) Take 1 tablet by mouth daily      fluticasone (FLONASE) 50 MCG/ACT nasal spray 1 spray by Each Nostril route as

## 2024-02-16 ENCOUNTER — APPOINTMENT (OUTPATIENT)
Dept: CT IMAGING | Age: 89
DRG: 689 | End: 2024-02-16
Payer: MEDICARE

## 2024-02-16 LAB
AMMONIA PLAS-SCNC: 21 UMOL/L (ref 11–51)
ANION GAP SERPL CALCULATED.3IONS-SCNC: 9 MMOL/L (ref 3–16)
BASOPHILS # BLD: 0 K/UL (ref 0–0.2)
BASOPHILS NFR BLD: 0.3 %
BUN SERPL-MCNC: 37 MG/DL (ref 7–20)
CALCIUM SERPL-MCNC: 9.2 MG/DL (ref 8.3–10.6)
CHLORIDE SERPL-SCNC: 101 MMOL/L (ref 99–110)
CO2 SERPL-SCNC: 30 MMOL/L (ref 21–32)
CREAT SERPL-MCNC: 0.7 MG/DL (ref 0.6–1.2)
DEPRECATED RDW RBC AUTO: 13.8 % (ref 12.4–15.4)
EOSINOPHIL # BLD: 0 K/UL (ref 0–0.6)
EOSINOPHIL NFR BLD: 0.1 %
GFR SERPLBLD CREATININE-BSD FMLA CKD-EPI: >60 ML/MIN/{1.73_M2}
GLUCOSE SERPL-MCNC: 110 MG/DL (ref 70–99)
HCT VFR BLD AUTO: 38.1 % (ref 36–48)
HGB BLD-MCNC: 13.1 G/DL (ref 12–16)
LYMPHOCYTES # BLD: 0.6 K/UL (ref 1–5.1)
LYMPHOCYTES NFR BLD: 8.6 %
MCH RBC QN AUTO: 32.3 PG (ref 26–34)
MCHC RBC AUTO-ENTMCNC: 34.4 G/DL (ref 31–36)
MCV RBC AUTO: 93.9 FL (ref 80–100)
MONOCYTES # BLD: 0.4 K/UL (ref 0–1.3)
MONOCYTES NFR BLD: 5.8 %
NEUTROPHILS # BLD: 5.6 K/UL (ref 1.7–7.7)
NEUTROPHILS NFR BLD: 85.2 %
PLATELET # BLD AUTO: 160 K/UL (ref 135–450)
PMV BLD AUTO: 9.7 FL (ref 5–10.5)
POTASSIUM SERPL-SCNC: 4.1 MMOL/L (ref 3.5–5.1)
RBC # BLD AUTO: 4.06 M/UL (ref 4–5.2)
SODIUM SERPL-SCNC: 140 MMOL/L (ref 136–145)
T4 FREE SERPL-MCNC: 1 NG/DL (ref 0.9–1.8)
TSH SERPL DL<=0.005 MIU/L-ACNC: 7.35 UIU/ML (ref 0.27–4.2)
WBC # BLD AUTO: 6.6 K/UL (ref 4–11)

## 2024-02-16 PROCEDURE — 80048 BASIC METABOLIC PNL TOTAL CA: CPT

## 2024-02-16 PROCEDURE — 94760 N-INVAS EAR/PLS OXIMETRY 1: CPT

## 2024-02-16 PROCEDURE — 2580000003 HC RX 258: Performed by: INTERNAL MEDICINE

## 2024-02-16 PROCEDURE — 6370000000 HC RX 637 (ALT 250 FOR IP): Performed by: NURSE PRACTITIONER

## 2024-02-16 PROCEDURE — 36415 COLL VENOUS BLD VENIPUNCTURE: CPT

## 2024-02-16 PROCEDURE — 84439 ASSAY OF FREE THYROXINE: CPT

## 2024-02-16 PROCEDURE — 97530 THERAPEUTIC ACTIVITIES: CPT

## 2024-02-16 PROCEDURE — 6360000002 HC RX W HCPCS: Performed by: INTERNAL MEDICINE

## 2024-02-16 PROCEDURE — 85025 COMPLETE CBC W/AUTO DIFF WBC: CPT

## 2024-02-16 PROCEDURE — 6370000000 HC RX 637 (ALT 250 FOR IP): Performed by: INTERNAL MEDICINE

## 2024-02-16 PROCEDURE — 82140 ASSAY OF AMMONIA: CPT

## 2024-02-16 PROCEDURE — 97535 SELF CARE MNGMENT TRAINING: CPT

## 2024-02-16 PROCEDURE — 1200000000 HC SEMI PRIVATE

## 2024-02-16 PROCEDURE — 84443 ASSAY THYROID STIM HORMONE: CPT

## 2024-02-16 PROCEDURE — 70450 CT HEAD/BRAIN W/O DYE: CPT

## 2024-02-16 RX ADMIN — HEPARIN SODIUM 5000 UNITS: 5000 INJECTION INTRAVENOUS; SUBCUTANEOUS at 20:20

## 2024-02-16 RX ADMIN — SENNOSIDES 8.6 MG: 8.6 TABLET, FILM COATED ORAL at 20:20

## 2024-02-16 RX ADMIN — LEVOTHYROXINE SODIUM 50 MCG: 0.05 TABLET ORAL at 06:04

## 2024-02-16 RX ADMIN — DOCUSATE SODIUM 100 MG: 100 CAPSULE, LIQUID FILLED ORAL at 08:51

## 2024-02-16 RX ADMIN — DOCUSATE SODIUM 100 MG: 100 CAPSULE, LIQUID FILLED ORAL at 20:20

## 2024-02-16 RX ADMIN — LATANOPROST 1 DROP: 50 SOLUTION OPHTHALMIC at 20:20

## 2024-02-16 RX ADMIN — SENNOSIDES 8.6 MG: 8.6 TABLET, FILM COATED ORAL at 08:51

## 2024-02-16 RX ADMIN — HEPARIN SODIUM 5000 UNITS: 5000 INJECTION INTRAVENOUS; SUBCUTANEOUS at 08:51

## 2024-02-16 RX ADMIN — Medication 10 ML: at 20:20

## 2024-02-17 LAB
ANION GAP SERPL CALCULATED.3IONS-SCNC: 10 MMOL/L (ref 3–16)
BACTERIA UR CULT: ABNORMAL
BASOPHILS # BLD: 0 K/UL (ref 0–0.2)
BASOPHILS NFR BLD: 0.2 %
BUN SERPL-MCNC: 25 MG/DL (ref 7–20)
CALCIUM SERPL-MCNC: 9.1 MG/DL (ref 8.3–10.6)
CHLORIDE SERPL-SCNC: 102 MMOL/L (ref 99–110)
CO2 SERPL-SCNC: 26 MMOL/L (ref 21–32)
CREAT SERPL-MCNC: 0.5 MG/DL (ref 0.6–1.2)
DEPRECATED RDW RBC AUTO: 13.9 % (ref 12.4–15.4)
EOSINOPHIL # BLD: 0 K/UL (ref 0–0.6)
EOSINOPHIL NFR BLD: 0.1 %
GFR SERPLBLD CREATININE-BSD FMLA CKD-EPI: >60 ML/MIN/{1.73_M2}
GLUCOSE SERPL-MCNC: 98 MG/DL (ref 70–99)
HCT VFR BLD AUTO: 39.6 % (ref 36–48)
HGB BLD-MCNC: 13.4 G/DL (ref 12–16)
LYMPHOCYTES # BLD: 0.7 K/UL (ref 1–5.1)
LYMPHOCYTES NFR BLD: 8.9 %
MAGNESIUM SERPL-MCNC: 1.9 MG/DL (ref 1.8–2.4)
MCH RBC QN AUTO: 31.8 PG (ref 26–34)
MCHC RBC AUTO-ENTMCNC: 33.9 G/DL (ref 31–36)
MCV RBC AUTO: 93.7 FL (ref 80–100)
MONOCYTES # BLD: 0.6 K/UL (ref 0–1.3)
MONOCYTES NFR BLD: 7.6 %
NEUTROPHILS # BLD: 6.8 K/UL (ref 1.7–7.7)
NEUTROPHILS NFR BLD: 83.2 %
ORGANISM: ABNORMAL
PLATELET # BLD AUTO: 153 K/UL (ref 135–450)
PMV BLD AUTO: 9.6 FL (ref 5–10.5)
POTASSIUM SERPL-SCNC: 3.5 MMOL/L (ref 3.5–5.1)
RBC # BLD AUTO: 4.23 M/UL (ref 4–5.2)
SODIUM SERPL-SCNC: 138 MMOL/L (ref 136–145)
WBC # BLD AUTO: 8.2 K/UL (ref 4–11)

## 2024-02-17 PROCEDURE — 6370000000 HC RX 637 (ALT 250 FOR IP): Performed by: INTERNAL MEDICINE

## 2024-02-17 PROCEDURE — 85025 COMPLETE CBC W/AUTO DIFF WBC: CPT

## 2024-02-17 PROCEDURE — 83735 ASSAY OF MAGNESIUM: CPT

## 2024-02-17 PROCEDURE — 2580000003 HC RX 258: Performed by: INTERNAL MEDICINE

## 2024-02-17 PROCEDURE — 94760 N-INVAS EAR/PLS OXIMETRY 1: CPT

## 2024-02-17 PROCEDURE — 2580000003 HC RX 258: Performed by: NURSE PRACTITIONER

## 2024-02-17 PROCEDURE — 36415 COLL VENOUS BLD VENIPUNCTURE: CPT

## 2024-02-17 PROCEDURE — 6360000002 HC RX W HCPCS: Performed by: NURSE PRACTITIONER

## 2024-02-17 PROCEDURE — 6370000000 HC RX 637 (ALT 250 FOR IP): Performed by: NURSE PRACTITIONER

## 2024-02-17 PROCEDURE — 80048 BASIC METABOLIC PNL TOTAL CA: CPT

## 2024-02-17 PROCEDURE — 1200000000 HC SEMI PRIVATE

## 2024-02-17 PROCEDURE — 6360000002 HC RX W HCPCS: Performed by: INTERNAL MEDICINE

## 2024-02-17 RX ORDER — QUETIAPINE FUMARATE 25 MG/1
12.5 TABLET, FILM COATED ORAL NIGHTLY
Status: DISCONTINUED | OUTPATIENT
Start: 2024-02-17 | End: 2024-02-18

## 2024-02-17 RX ADMIN — HEPARIN SODIUM 5000 UNITS: 5000 INJECTION INTRAVENOUS; SUBCUTANEOUS at 20:31

## 2024-02-17 RX ADMIN — Medication 10 ML: at 08:08

## 2024-02-17 RX ADMIN — SENNOSIDES 8.6 MG: 8.6 TABLET, FILM COATED ORAL at 20:31

## 2024-02-17 RX ADMIN — QUETIAPINE FUMARATE 12.5 MG: 25 TABLET ORAL at 20:31

## 2024-02-17 RX ADMIN — DOCUSATE SODIUM 100 MG: 100 CAPSULE, LIQUID FILLED ORAL at 08:07

## 2024-02-17 RX ADMIN — HEPARIN SODIUM 5000 UNITS: 5000 INJECTION INTRAVENOUS; SUBCUTANEOUS at 08:07

## 2024-02-17 RX ADMIN — DOCUSATE SODIUM 100 MG: 100 CAPSULE, LIQUID FILLED ORAL at 20:31

## 2024-02-17 RX ADMIN — LEVOTHYROXINE SODIUM 50 MCG: 0.05 TABLET ORAL at 05:41

## 2024-02-17 RX ADMIN — LATANOPROST 1 DROP: 50 SOLUTION OPHTHALMIC at 20:31

## 2024-02-17 RX ADMIN — SENNOSIDES 8.6 MG: 8.6 TABLET, FILM COATED ORAL at 08:07

## 2024-02-17 RX ADMIN — POLYETHYLENE GLYCOL 3350 17 G: 17 POWDER, FOR SOLUTION ORAL at 08:07

## 2024-02-17 RX ADMIN — ZIPRASIDONE MESYLATE 10 MG: 20 INJECTION, POWDER, LYOPHILIZED, FOR SOLUTION INTRAMUSCULAR at 01:30

## 2024-02-17 RX ADMIN — Medication 10 ML: at 20:32

## 2024-02-18 LAB
ANION GAP SERPL CALCULATED.3IONS-SCNC: 6 MMOL/L (ref 3–16)
BASOPHILS # BLD: 0.1 K/UL (ref 0–0.2)
BASOPHILS NFR BLD: 1.1 %
BUN SERPL-MCNC: 26 MG/DL (ref 7–20)
CALCIUM SERPL-MCNC: 8.5 MG/DL (ref 8.3–10.6)
CHLORIDE SERPL-SCNC: 98 MMOL/L (ref 99–110)
CO2 SERPL-SCNC: 31 MMOL/L (ref 21–32)
CREAT SERPL-MCNC: 0.5 MG/DL (ref 0.6–1.2)
DEPRECATED RDW RBC AUTO: 13.6 % (ref 12.4–15.4)
EOSINOPHIL # BLD: 0.2 K/UL (ref 0–0.6)
EOSINOPHIL NFR BLD: 2.8 %
GFR SERPLBLD CREATININE-BSD FMLA CKD-EPI: >60 ML/MIN/{1.73_M2}
GLUCOSE SERPL-MCNC: 85 MG/DL (ref 70–99)
HCT VFR BLD AUTO: 40.2 % (ref 36–48)
HGB BLD-MCNC: 13.9 G/DL (ref 12–16)
LYMPHOCYTES # BLD: 1.1 K/UL (ref 1–5.1)
LYMPHOCYTES NFR BLD: 16.2 %
MAGNESIUM SERPL-MCNC: 1.9 MG/DL (ref 1.8–2.4)
MCH RBC QN AUTO: 32.3 PG (ref 26–34)
MCHC RBC AUTO-ENTMCNC: 34.7 G/DL (ref 31–36)
MCV RBC AUTO: 93.1 FL (ref 80–100)
MONOCYTES # BLD: 0.6 K/UL (ref 0–1.3)
MONOCYTES NFR BLD: 9.6 %
NEUTROPHILS # BLD: 4.7 K/UL (ref 1.7–7.7)
NEUTROPHILS NFR BLD: 70.3 %
PLATELET # BLD AUTO: 157 K/UL (ref 135–450)
PMV BLD AUTO: 9.7 FL (ref 5–10.5)
POTASSIUM SERPL-SCNC: 3 MMOL/L (ref 3.5–5.1)
RBC # BLD AUTO: 4.32 M/UL (ref 4–5.2)
SODIUM SERPL-SCNC: 135 MMOL/L (ref 136–145)
WBC # BLD AUTO: 6.8 K/UL (ref 4–11)

## 2024-02-18 PROCEDURE — 80048 BASIC METABOLIC PNL TOTAL CA: CPT

## 2024-02-18 PROCEDURE — 6360000002 HC RX W HCPCS: Performed by: INTERNAL MEDICINE

## 2024-02-18 PROCEDURE — 36415 COLL VENOUS BLD VENIPUNCTURE: CPT

## 2024-02-18 PROCEDURE — 6370000000 HC RX 637 (ALT 250 FOR IP): Performed by: INTERNAL MEDICINE

## 2024-02-18 PROCEDURE — 2580000003 HC RX 258: Performed by: INTERNAL MEDICINE

## 2024-02-18 PROCEDURE — 2580000003 HC RX 258: Performed by: NURSE PRACTITIONER

## 2024-02-18 PROCEDURE — 94760 N-INVAS EAR/PLS OXIMETRY 1: CPT

## 2024-02-18 PROCEDURE — 85025 COMPLETE CBC W/AUTO DIFF WBC: CPT

## 2024-02-18 PROCEDURE — 6360000002 HC RX W HCPCS: Performed by: NURSE PRACTITIONER

## 2024-02-18 PROCEDURE — 83735 ASSAY OF MAGNESIUM: CPT

## 2024-02-18 PROCEDURE — 1200000000 HC SEMI PRIVATE

## 2024-02-18 PROCEDURE — 6370000000 HC RX 637 (ALT 250 FOR IP): Performed by: NURSE PRACTITIONER

## 2024-02-18 RX ORDER — ENOXAPARIN SODIUM 100 MG/ML
30 INJECTION SUBCUTANEOUS NIGHTLY
Status: DISCONTINUED | OUTPATIENT
Start: 2024-02-18 | End: 2024-02-19 | Stop reason: HOSPADM

## 2024-02-18 RX ADMIN — POLYETHYLENE GLYCOL 3350 17 G: 17 POWDER, FOR SOLUTION ORAL at 10:08

## 2024-02-18 RX ADMIN — LATANOPROST 1 DROP: 50 SOLUTION OPHTHALMIC at 21:25

## 2024-02-18 RX ADMIN — HEPARIN SODIUM 5000 UNITS: 5000 INJECTION INTRAVENOUS; SUBCUTANEOUS at 10:07

## 2024-02-18 RX ADMIN — WATER 1000 MG: 1 INJECTION INTRAMUSCULAR; INTRAVENOUS; SUBCUTANEOUS at 14:54

## 2024-02-18 RX ADMIN — Medication 10 ML: at 10:08

## 2024-02-18 RX ADMIN — POTASSIUM BICARBONATE 40 MEQ: 782 TABLET, EFFERVESCENT ORAL at 10:07

## 2024-02-18 RX ADMIN — SENNOSIDES 8.6 MG: 8.6 TABLET, FILM COATED ORAL at 21:18

## 2024-02-18 RX ADMIN — SENNOSIDES 8.6 MG: 8.6 TABLET, FILM COATED ORAL at 10:07

## 2024-02-18 RX ADMIN — ENOXAPARIN SODIUM 30 MG: 100 INJECTION SUBCUTANEOUS at 21:21

## 2024-02-18 RX ADMIN — Medication 10 ML: at 21:23

## 2024-02-18 RX ADMIN — DOCUSATE SODIUM 100 MG: 100 CAPSULE, LIQUID FILLED ORAL at 21:18

## 2024-02-18 RX ADMIN — LEVOTHYROXINE SODIUM 50 MCG: 0.05 TABLET ORAL at 06:46

## 2024-02-18 RX ADMIN — DOCUSATE SODIUM 100 MG: 100 CAPSULE, LIQUID FILLED ORAL at 10:07

## 2024-02-19 VITALS
WEIGHT: 113.54 LBS | RESPIRATION RATE: 15 BRPM | OXYGEN SATURATION: 95 % | HEART RATE: 71 BPM | TEMPERATURE: 98 F | HEIGHT: 63 IN | DIASTOLIC BLOOD PRESSURE: 74 MMHG | BODY MASS INDEX: 20.12 KG/M2 | SYSTOLIC BLOOD PRESSURE: 152 MMHG

## 2024-02-19 LAB
ANION GAP SERPL CALCULATED.3IONS-SCNC: 7 MMOL/L (ref 3–16)
BASOPHILS # BLD: 0.1 K/UL (ref 0–0.2)
BASOPHILS NFR BLD: 1.3 %
BUN SERPL-MCNC: 26 MG/DL (ref 7–20)
CALCIUM SERPL-MCNC: 8.6 MG/DL (ref 8.3–10.6)
CHLORIDE SERPL-SCNC: 102 MMOL/L (ref 99–110)
CO2 SERPL-SCNC: 31 MMOL/L (ref 21–32)
CREAT SERPL-MCNC: 0.5 MG/DL (ref 0.6–1.2)
DEPRECATED RDW RBC AUTO: 13.6 % (ref 12.4–15.4)
EOSINOPHIL # BLD: 0.2 K/UL (ref 0–0.6)
EOSINOPHIL NFR BLD: 2.8 %
GFR SERPLBLD CREATININE-BSD FMLA CKD-EPI: >60 ML/MIN/{1.73_M2}
GLUCOSE SERPL-MCNC: 86 MG/DL (ref 70–99)
HCT VFR BLD AUTO: 36.5 % (ref 36–48)
HGB BLD-MCNC: 12.4 G/DL (ref 12–16)
LYMPHOCYTES # BLD: 1 K/UL (ref 1–5.1)
LYMPHOCYTES NFR BLD: 13.2 %
MCH RBC QN AUTO: 31.9 PG (ref 26–34)
MCHC RBC AUTO-ENTMCNC: 34 G/DL (ref 31–36)
MCV RBC AUTO: 93.6 FL (ref 80–100)
MONOCYTES # BLD: 0.7 K/UL (ref 0–1.3)
MONOCYTES NFR BLD: 8.8 %
NEUTROPHILS # BLD: 5.5 K/UL (ref 1.7–7.7)
NEUTROPHILS NFR BLD: 73.9 %
PLATELET # BLD AUTO: 179 K/UL (ref 135–450)
PMV BLD AUTO: 9.6 FL (ref 5–10.5)
POTASSIUM SERPL-SCNC: 3.7 MMOL/L (ref 3.5–5.1)
RBC # BLD AUTO: 3.9 M/UL (ref 4–5.2)
SODIUM SERPL-SCNC: 140 MMOL/L (ref 136–145)
WBC # BLD AUTO: 7.4 K/UL (ref 4–11)

## 2024-02-19 PROCEDURE — 94760 N-INVAS EAR/PLS OXIMETRY 1: CPT

## 2024-02-19 PROCEDURE — 85025 COMPLETE CBC W/AUTO DIFF WBC: CPT

## 2024-02-19 PROCEDURE — 6370000000 HC RX 637 (ALT 250 FOR IP): Performed by: NURSE PRACTITIONER

## 2024-02-19 PROCEDURE — 97530 THERAPEUTIC ACTIVITIES: CPT

## 2024-02-19 PROCEDURE — 97535 SELF CARE MNGMENT TRAINING: CPT

## 2024-02-19 PROCEDURE — 80048 BASIC METABOLIC PNL TOTAL CA: CPT

## 2024-02-19 PROCEDURE — 2580000003 HC RX 258: Performed by: INTERNAL MEDICINE

## 2024-02-19 PROCEDURE — 6370000000 HC RX 637 (ALT 250 FOR IP): Performed by: INTERNAL MEDICINE

## 2024-02-19 PROCEDURE — 36415 COLL VENOUS BLD VENIPUNCTURE: CPT

## 2024-02-19 RX ORDER — CEFUROXIME AXETIL 250 MG/1
250 TABLET ORAL 2 TIMES DAILY
Qty: 6 TABLET | Refills: 0
Start: 2024-02-19 | End: 2024-02-22

## 2024-02-19 RX ORDER — POLYETHYLENE GLYCOL 3350 17 G/17G
17 POWDER, FOR SOLUTION ORAL DAILY
Qty: 116 G | Refills: 0
Start: 2024-02-19 | End: 2024-02-29

## 2024-02-19 RX ORDER — SENNOSIDES A AND B 8.6 MG/1
1 TABLET, FILM COATED ORAL 2 TIMES DAILY
Qty: 60 TABLET | Refills: 0
Start: 2024-02-19 | End: 2024-03-20

## 2024-02-19 RX ADMIN — DOCUSATE SODIUM 100 MG: 100 CAPSULE, LIQUID FILLED ORAL at 08:50

## 2024-02-19 RX ADMIN — LEVOTHYROXINE SODIUM 50 MCG: 0.05 TABLET ORAL at 06:14

## 2024-02-19 RX ADMIN — Medication 10 ML: at 08:50

## 2024-02-19 RX ADMIN — SENNOSIDES 8.6 MG: 8.6 TABLET, FILM COATED ORAL at 08:50

## 2024-02-19 NOTE — CARE COORDINATION
Case Management Discharge Note          Date / Time of Note: 2/19/2024 12:38 PM                  Patient Name: Ambar Larson   YOB: 1928  Diagnosis: UTI (urinary tract infection) [N39.0]  Left hip pain [M25.552]  Inability to walk [R26.2]  Urinary tract infection without hematuria, site unspecified [N39.0]   Date / Time: 2/13/2024  5:45 PM    Financial:  Payor: MEDICARE / Plan: MEDICARE PART A AND B / Product Type: *No Product type* /      Pharmacy:    [id number] [place], [place] - [address]  P [phone] - F [phone]  [address]  [facility] [address]  Phone: [phone] Fax: [phone]      Assistance purchasing medications?: Potential Assistance Purchasing Medications: No  Assistance provided by Case Management: None at this time    DISCHARGE Disposition: Skilled Nursing Facility (SNF)    Nursing Facility:   Discharging to Facility/ Agency   Name: [facility]  Address:  [address] [place] [address]  Phone:  [phone]  Fax:  [phone]    LOC at discharge: Skilled Nursing  QUYEN Completed: Yes             NURSING REPORT NUMBER: [phone]               NURSING FAX NUMBER: [phone]    Notification completed in HENS/PAS?:  Yes : CM has completed HENS online through secure website for SNF admission at [facility].   Document ID #:      Transportation:  Transportation PLAN for discharge: EMS transportation   Mode of Transport: Ambulance stretcher - [facility]  Reason for medical transport: Confused due to UTI and requires ambulance transport due to inability to stand requiring the use of stedy for transport to chair/toilet. Unable to sit in a wheelchair for transportation due to sliding out of chair. Requires continuous monitoring for transport time.     Name of Transport Company: [facility]  Phone: [phone]  Time of Transport: 3:00 PM    Transport form completed: Yes    IMM Completed:   Yes, Case management has presented and reviewed IMM

## 2024-02-19 NOTE — DISCHARGE INSTR - COC
Continuity of Care Form    Patient Name: Ambar Larson   :  1/10/1928  MRN:  [id number]    Admit date:  2024  Discharge date:  24    Code Status Order: Full Code   Advance Directives:     Admitting Physician:  Marco Dumont MD  PCP: Rebekah Vargas MD    Discharging Nurse: Brunilda COLÓN RN  Discharging Hospital Unit/Room#: R8K-4237/4258-01  Discharging Unit Phone Number: [phone]    Emergency Contact:   Extended Emergency Contact Information  Primary Emergency Contact: tatianna thompson  Home Phone: [phone]  Mobile Phone: [phone]  Relation: Niece/Nephew   needed? No  Secondary Emergency Contact: Óscar Chakraborty  Home Phone: [phone]  Relation: Niece/Nephew    Past Surgical History:  History reviewed. No pertinent surgical history.    Immunization History:   Immunization History   Administered Date(s) Administered    COVID-19, PFIZER Bivalent, DO NOT Dilute, (age 12y+), IM, 30 mcg/0.3 mL 10/23/2022    COVID-19, PFIZER GRAY top, DO NOT Dilute, (age 12 y+), IM, 30 mcg/0.3 mL 2022    COVID-19, PFIZER PURPLE top, DILUTE for use, (age 12 y+), 30mcg/0.3mL 2021, 2021, 10/03/2021    COVID-19, PFIZER, (- formula), (age 12y+), IM, 30mcg/0.3mL 2023       Active Problems:  Patient Active Problem List   Diagnosis Code    SBO (small bowel obstruction) ([facility]) K56.609    UTI (urinary tract infection) N39.0       Isolation/Infection:   Isolation            No Isolation          Patient Infection Status       None to display                     Nurse Assessment:  Last Vital Signs: BP (!) 152/74   Pulse 71   Temp 98 °F (36.7 °C) (Oral)   Resp 15   Ht 1.6 m (5' 3\")   Wt 51.5 kg (113 lb 8.6 oz)   SpO2 94%   BMI 20.11 kg/m²     Last documented pain score (0-10 scale): Pain Level: 0  Last Weight:   Wt Readings from Last 1 Encounters:   24 51.5 kg (113 lb 8.6 oz)     Mental Status:  oriented, alert, coherent, and logical    IV Access:  - None    Nursing

## 2024-02-19 NOTE — DISCHARGE SUMMARY
discharge to [facility].    Urinary tract infection  -UA appears infected, started on ceftriaxone,   -No recent urine cultures in Care Everywhere patient's chart, last urine culture from 2019 showed MSSA  -Urine culture grew Proteus, sensitivity showed pansensitive  -r completed 4 days of IV Rocephin, will complete 3 days of oral  cefuroxime to complete 7 days of therapy     Acute metabolic encephalopathy  Dementia  -Per niece at bedside, patient is having more confusion lately, they are worried about her living alone.  Does get confused with UTI.   -CT head nonacute  -TSH and ammonia level normal  -Patient was not able to tolerate Seroquel secondary to excessive daytime sleepiness.  Discontinued.  -Currently alert and oriented x 4 this a.m., will need to follow-up with neurology/PCP for further dementia workup outpatient.     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  Patient reports improvement in hip pain.  Continue PT/OT recommend SNF, patient agreeable  -Continue supportive care     Right third toe wound  Small wounds on left shin  -Was following with podiatry (Dr. Roberts) for this and last saw in office 2/6/24, completed 2 weeks of doxycycline except one dose.   -Will complete therapy with a cephalosporin as above  -Wound care consulted.     Hypothyroidism  -Resume home Synthroid.  Free T4 WNL     Dehydration  -Resolved with IVFs     Hypokalemia  -Resolved, WNL this a.m. at 3.7    Patient stated they felt well this AM, was alert and oriented x 4 this a.m.  Patient was still agreeable to go to [facility] as that was the original plan that was discussed with her niece and POA.      Physical Exam Performed:     BP (!) 152/74   Pulse 71   Temp 98 °F (36.7 °C) (Oral)   Resp 15   Ht 1.6 m (5' 3\")   Wt 51.5 kg (113 lb 8.6 oz)   SpO2 94%   BMI 20.11 kg/m²       General appearance:  No apparent distress, appears stated age

## 2024-02-19 NOTE — CARE COORDINATION
Discharge order noted. Voicemail message left for Blaire in admissions at [facility] regarding room availability for an admission today.     Melania REID RN  Case Management  [phone]    Electronically signed by Melania Wong RN on 2/19/2024 at 11:11 AM

## 2024-02-19 NOTE — PROGRESS NOTES
Hospitalist Progress Note      PCP: Rebekah Vargas MD    Date of Admission: 2/13/2024    Chief Complaint: Left hip pain    Hospital Course: Ambar Larson is a 89 y.o. female with a PMH of hypothyroidism who presents to the ED with complaints of left hip pain.  According to patient she currently lives alone has noticed worsening left hip pain with difficulty in ambulation.  Family concerned about worsening weakness with associated confusion.  Patient does have a history of total hip arthroplasty     Labs shows sodium of 140 chloride of 102 BUN of 40 glucose of 86 CBC stable with WBC of 5.9 hemoglobin of 14.1 hematocrit of 41.7.  Fluids A/P/COVID-negative.  Urinalysis shows presence of nitrates leukocyte esterase.  Chest x-ray shows no acute cardiopulmonary process.  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  Patient was given Rocephin 1 g.  Tylenol 1 g.  She was admitted for further workup.      Subjective: Patient laying in bed, still having occasional hallucinations, but knows she is at [facility] today.  Less confused.  Overnight she slept well after the Seroquel, but she is drowsy this morning.  Will stop the Seroquel.   Patient has no insight at this point, denied any needs.  Seems to have sundowning that starts about 2:00 in the afternoon.  No family at bedside.  I called the niece, discussed the plan of care over the phone as well as CAT scan results.  She verbalized understanding and agreement, awaiting transfer to [facility] tomorrow.    Assessment/Plan:  Urinary tract infection  -UA appears infected, started on ceftriaxone,   -No recent urine cultures in Care Everywhere patient's chart, last urine culture from 2019 showed MSSA  -Urine culture grew Proteus, sensitivity showed pansensitive  -rocephin was only ordered for 3 doses, last dose given on Thursday 2/15, then automatically dc'd.  Will 
      Hospitalist Progress Note      PCP: Rebekah Vargas MD    Date of Admission: 2/13/2024    Chief Complaint: Left hip pain    Hospital Course: Ambar Larson is a 89 y.o. female with a PMH of hypothyroidism who presents to the ED with complaints of left hip pain.  According to patient she currently lives alone has noticed worsening left hip pain with difficulty in ambulation.  Family concerned about worsening weakness with associated confusion.  Patient does have a history of total hip arthroplasty     Labs shows sodium of 140 chloride of 102 BUN of 40 glucose of 86 CBC stable with WBC of 5.9 hemoglobin of 14.1 hematocrit of 41.7.  Fluids A/P/COVID-negative.  Urinalysis shows presence of nitrates leukocyte esterase.  Chest x-ray shows no acute cardiopulmonary process.  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  Patient was given Rocephin 1 g.  Tylenol 1 g.  She was admitted for further workup.    PT/OT currently recommending SNF, patient not agreeable.      Subjective: Patient laying in bed, having hallucinations and is confused.   Patient has no insight at this point, denied any needs.  Nursing reports patient was much more alert and oriented this morning.  She did become very confused overnight.  And received Geodon.  Seems to have sundowning that starts about 2:00 in the afternoon.  No family at bedside.  I called the niece, no answer but left voicemail.      Assessment/Plan:  Urinary tract infection  -UA appears infected, started on ceftriaxone,   -No recent urine cultures in Care Everywhere patient's chart, last urine culture from 2019 showed MSSA  -Urine culture grew Proteus, sensitivity showed pansensitive    Acute metabolic encephalopathy  Dementia  -Per niece at bedside, patient is having more confusion lately, they are worried about her living alone.  Does get confused with UTI.  Continue 
      Hospitalist Progress Note      PCP: Rebekah Vargas MD    Date of Admission: 2/13/2024    Chief Complaint: Left hip pain    Hospital Course: Ambar Larson is a 89 y.o. female with a PMH of hypothyroidism who presents to the ED with complaints of left hip pain.  According to patient she currently lives alone has noticed worsening left hip pain with difficulty in ambulation.  Family concerned about worsening weakness with associated confusion.  Patient does have a history of total hip arthroplasty     Labs shows sodium of 140 chloride of 102 BUN of 40 glucose of 86 CBC stable with WBC of 5.9 hemoglobin of 14.1 hematocrit of 41.7.  Fluids A/P/COVID-negative.  Urinalysis shows presence of nitrates leukocyte esterase.  Chest x-ray shows no acute cardiopulmonary process.  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  Patient was given Rocephin 1 g.  Tylenol 1 g.  She was admitted for further workup.    PT/OT currently recommending SNF, patient not agreeable.      Subjective: Patient laying in bed, having hallucinations and is confused.  Niece at the bedside.  States patient has been doing this at home prior to the UTI.  Does not want her to return home, would be unsafe to be alone.  Niece is agreeable to ECF,  informed and they will discuss facilities.  Patient has no insight at this point, denied any needs.      Assessment/Plan:  Urinary tract infection  -UA appears infected, started on ceftriaxone, continue pending urine culture.  -No recent urine cultures in Care Everywhere patient's chart, last urine culture from 2019 showed MSSA  -Urine culture grew Proteus, sensitivity pending    Acute metabolic encephalopathy  -Per niece at bedside, patient is having more confusion lately, they are worried about her living alone.  Does get confused with UTI.  Continue treatment for UTI, might need 
      Hospitalist Progress Note      PCP: Rebekah Vargas MD    Date of Admission: 2/13/2024    Chief Complaint: Left hip pain    Hospital Course: Ambar Larson is a 89 y.o. female with a PMH of hypothyroidism who presents to the ED with complaints of left hip pain.  According to patient she currently lives alone has noticed worsening left hip pain with difficulty in ambulation.  Family concerned about worsening weakness with associated confusion.  Patient does have a history of total hip arthroplasty     Labs shows sodium of 140 chloride of 102 BUN of 40 glucose of 86 CBC stable with WBC of 5.9 hemoglobin of 14.1 hematocrit of 41.7.  Fluids A/P/COVID-negative.  Urinalysis shows presence of nitrates leukocyte esterase.  Chest x-ray shows no acute cardiopulmonary process.  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  Patient was given Rocephin 1 g.  Tylenol 1 g.  She was admitted for further workup.    PT/OT currently recommending SNF, patient not agreeable.      Subjective: Patient seen sitting in chair, is currently alert and oriented x 3, Denies any hip pain currently, denies any chest pain or shortness of breath.  Patient reports dysuria.  Urine culture pending.    Assessment/Plan:  Urinary tract infection  -UA appears infected, started on ceftriaxone, continue pending urine culture.  -No recent urine cultures in Care Everywhere patient's chart, last urine culture from 2019 showed MSSA  -Urine culture pending    Acute metabolic encephalopathy  -Per niece at bedside, patient is having more confusion lately, they are worried about her living alone.  Does get confused with UTI.  Continue treatment for UTI, might need placement but patient currently refusing.  -Patient is currently alert and oriented x 3 but forgetful and not completely aware of situation, poor insight.    Left hip pain  Debility  -Left hip 
      Hospitalist Progress Note      PCP: Rebekah Vargas MD    Date of Admission: 2/13/2024    Chief Complaint: Left hip pain    Hospital Course: Ambar Larson is a 89 y.o. female with a PMH of hypothyroidism who presents to the ED with complaints of left hip pain.  According to patient she currently lives alone has noticed worsening left hip pain with difficulty in ambulation.  Family concerned about worsening weakness with associated confusion.  Patient does have a history of total hip arthroplasty     Labs shows sodium of 140 chloride of 102 BUN of 40 glucose of 86 CBC stable with WBC of 5.9 hemoglobin of 14.1 hematocrit of 41.7.  Fluids A/P/COVID-negative.  Urinalysis shows presence of nitrates leukocyte esterase.  Chest x-ray shows no acute cardiopulmonary process.  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  Patient was given Rocephin 1 g.  Tylenol 1 g.  She was admitted for further workup.    PT/OT currently recommending SNF, patient not agreeable.      Subjective: Patient seen sitting in chair, is currently alert and oriented x 3, not completely to why she came into the hospital.  Denies any hip pain currently, denies any chest pain or shortness of breath.  Denies any dysuria or other urinary symptoms.  Patient's niece is also at bedside, states she has been concerned because patient lives alone, she is debilitated and they have a hard time getting her around.  States she does get confused when she has UTIs and she also does get frequent UTIs.  She did just recently see a podiatrist for a wound on her right third toe and she completed a 2-week course of doxycycline -1 dose.    Assessment/Plan:  Urinary tract infection  -UA appears infected, started on ceftriaxone, continue pending urine culture.  -No recent urine cultures in Care Everywhere patient's chart, last urine culture from 2019 showed 
4 Eyes Skin Assessment     NAME:  Ambar Larson  YOB: 1928  MEDICAL RECORD NUMBER:  [id number]    The patient is being assessed for  Admission    I agree that at least one RN has performed a thorough Head to Toe Skin Assessment on the patient. ALL assessment sites listed below have been assessed.      Areas assessed by both nurses:    Head, Face, Ears, Shoulders, Back, Chest, Arms, Elbows, Hands, Sacrum. Buttock, Coccyx, Ischium, and Legs. Feet and Heels        Does the Patient have a Wound? Yes wound(s) were present on assessment. LDA wound assessment was Initiated and completed by RN       Elias Prevention initiated by RN: Yes  Wound Care Orders initiated by RN: Yes    Pressure Injury (Stage 3,4, Unstageable, DTI, NWPT, and Complex wounds) if present, place Wound referral order by RN under : No    New Ostomies, if present place, Ostomy referral order under : No     Nurse 1 eSignature: Electronically signed by Izabel Russell RN on 2/14/24 at 1:19 AM EST    **SHARE this note so that the co-signing nurse can place an eSignature**    Nurse 2 eSignature: Electronically signed by Radha Tsang RN on 2/14/24 at 2:57 AM EST   
Clinical Pharmacy Note  Subcutaneous Anticoagulant Adjustment     Enoxaparin has been adjusted to heparin based on [facility] policy.    Recent Labs     02/13/24  1747 02/14/24  0627   CREATININE 1.0 0.9     Recent Labs     02/14/24  0627   HGB 12.8   HCT 38.1        Estimated Creatinine Clearance: 25 mL/min (based on SCr of 0.9 mg/dL).    Pharmacist Review of Appropriate Use and Automatic Dose Adjustment of Subcutaneous Anticoagulants (Adult)    The guidance below is to provide initial recommendations for dosing. If recommended dose does not align well with patient's current clinical picture, communications with the care team will occur to determine most appropriate medication and dose.    TABLE 1.  ENOXAPARIN ROUTINE PROPHYLAXIS DOSING (Medically ill, routine surgery)   Patient Weight (kg)     50.9 and below 51 - 100.9 101 - 150.9 151 - 174.9 175 or greater         Estimated CrCl  (ml/min) 30 or greater   30 mg SUBQ daily   40 mg SUBQ daily 30 mg SUBQ BID  40 mg SUBQ BID 60mg SUBQ BID      15-29 UFH 5000 units SUBQ BID   30 mg SUBQ daily 30 mg SUBQ daily 40 mg SUBQ daily   60 mg SUBQ daily      Less than 15 or Dialysis UFH 5000 units SUBQ BID   UFH 5000 units SUBQ TID UFH 7500 units SUBQ TID       TABLE 2.  ENOXAPARIN TREATMENT DOSING   (Based on 1mg/kg BID for DVT/PE/AFib)   Patient Weight (kg)     50.9 and below .9 151-189.9 190 or greater         Estimated CrCl  (ml/min) 30 or greater Recommend [facility] standardized UFH infusion, apixaban or rivaroxaban 1mg/kg SUBQ BID 1mg/kg SUBQ BID if anti-Xa levels are feasible per institution.  Alternatively,  recommend switch to [facility] standardized UFH infusion     Recommend switch to [facility] standardized UFH infusion.      15-29 Recommend [facility] standardized UFH infusion or apixaban 1mg/kg SUBQ daily Recommend switch to [facility] standardized UFH infusion     Less than 15 or Dialysis Recommend switch to [facility] standardized UFH infusion.     Marlyn Roberts RPH 2/15/2024 10:52 AM    
Medication Reconciliation     List of medications patient is currently taking is complete.     Source of information: 1. Conversation with patient at bedside                                      2. EPIC records      Allergies  Sulfa antibiotics and Aspirin     Notes regarding home medications:  1. Patient states she has only received one dose of doxycyline 100 mg today prior to arrival in the ED.   2. Pt was started on a 10 day course of doxycyline 100 mg BID and currently only has one dose remaining.    Sofia Barnhart, Pharmacy Intern  2/13/2024 8:30 PM     
Occupational Therapy  Facility/Department: [address] MED SURG  Occupational Therapy Daily Treatment    Name: Ambar Larson  : 1/10/1928  MRN: [id number]  Date of Service: 2/15/2024    Discharge Recommendations:  3-5 sessions per week, Patient would benefit from continued therapy after discharge      Ambar Larson scored a 16/24 on the AM-PAC ADL Inpatient form. Current research shows that an AM-PAC score of 17 or less is typically not associated with a discharge to the patient's home setting. Based on the patient's AM-PAC score and their current ADL deficits, it is recommended that the patient have 3-5 sessions per week of Occupational Therapy at d/c to increase the patient's independence.  Please see assessment section for further patient specific details.    If patient discharges prior to next session this note will serve as a discharge summary.  Please see below for the latest assessment towards goals.        Patient Diagnosis(es): The primary encounter diagnosis was Urinary tract infection without hematuria, site unspecified. Diagnoses of Inability to walk and Left hip pain were also pertinent to this visit.  Past Medical History:  has no past medical history on file.  Past Surgical History:  has no past surgical history on file.    Treatment Diagnosis: Decreased: ADLs, functional transfers/mobility      Assessment   Performance deficits / Impairments: Decreased functional mobility ;Decreased ADL status;Decreased endurance;Decreased strength;Decreased ROM;Decreased high-level IADLs;Decreased balance;Decreased cognition;Decreased safe awareness  Assessment: Pt is a 89 yo F who presented with c/o left hip pain, worsening weakness and confusion. Found to have UTI. Imaging of hip negative. Prior to admit, pt lives alone in a condo where she is typically independent w/ use of 4WW. Pt remains below baseline, most limited by decreased strength/endurance, decreased balance, and decreased safety. She required up to min-mod 
Occupational Therapy  Facility/Department: [address] MED SURG  Occupational Therapy Daily Treatment    Name: Ambar Larson  : 1/10/1928  MRN: [id number]  Date of Service: 2024    Discharge Recommendations:  3-5 sessions per week, Patient would benefit from continued therapy after discharge      Ambar Larson scored a 12/24 on the AM-PAC ADL Inpatient form. Current research shows that an AM-PAC score of 17 or less is typically not associated with a discharge to the patient's home setting. Based on the patient's AM-PAC score and their current ADL deficits, it is recommended that the patient have 3-5 sessions per week of Occupational Therapy at d/c to increase the patient's independence.  Please see assessment section for further patient specific details.    If patient discharges prior to next session this note will serve as a discharge summary.  Please see below for the latest assessment towards goals.      Patient Diagnosis(es): The primary encounter diagnosis was Urinary tract infection without hematuria, site unspecified. Diagnoses of Inability to walk and Left hip pain were also pertinent to this visit.  Past Medical History:  has no past medical history on file.  Past Surgical History:  has no past surgical history on file.    Treatment Diagnosis: Decreased: ADLs, functional transfers/mobility      Assessment   Performance deficits / Impairments: Decreased functional mobility ;Decreased ADL status;Decreased endurance;Decreased strength;Decreased ROM;Decreased high-level IADLs;Decreased balance;Decreased cognition;Decreased safe awareness  Assessment: Pt is a 89 yo F who presented with c/o left hip pain, worsening weakness and confusion. Found to have UTI. Imaging of hip negative. Prior to admit, [facility] she is typically independent w/ use of 4WW. Pt remains below baseline, worsening weakness, confusion, and L hip pain today. She required mod Ax2 for sit<>stand transfers, was unable to 
Occupational Therapy  Facility/Department: [address] MED SURG  Occupational Therapy Initial Assessment    Name: Ambar Larson  : 1/10/1928  MRN: [id number]  Date of Service: 2024    Discharge Recommendations:  3-5 sessions per week, 24 hour supervision or assist, Home with Home health OT ( vs SNF)     Ambar Larson scored a 17/24 on the AM-[facility] ADL Inpatient form.     If patient discharges prior to next session this note will serve as a discharge summary.  Please see below for the latest assessment towards goals.        Patient Diagnosis(es): The primary encounter diagnosis was Urinary tract infection without hematuria, site unspecified. Diagnoses of Inability to walk and Left hip pain were also pertinent to this visit.  Past Medical History:  has no past medical history on file.  Past Surgical History:  has no past surgical history on file.    Treatment Diagnosis: Decreased: ADLs, functional transfers/mobility      Assessment   Performance deficits / Impairments: Decreased functional mobility ;Decreased ADL status;Decreased endurance;Decreased strength;Decreased ROM;Decreased high-level IADLs;Decreased balance  Assessment: Pt is a 89 yo F who presented with c/o left hip pain, worsening weakness and confusion. Found to have UTI. Imaging of hip negative. Prior to admit, pt lives alone in a condo where she is typically independent w/ use of 4WW. She is below baseline at this time, requiring CGA-min A for functional transfers, CGA functional mobility, CGA toileting and standing grooming, but up to max A LB dressing. Will continue to follow while hospitalized. Pt currently demonstrating need for at least initial  supervision and home OT to improve her safety and reduce fall risk, and if this cannot be provided would recommend continued skilled OT 3-5x/wk.  Treatment Diagnosis: Decreased: ADLs, functional transfers/mobility  Prognosis: Good  Decision Making: Medium Complexity  REQUIRES OT FOLLOW-UP: 
Patient A+O x 4 confusion at times. Up to chair legs elevated and on pillow to float heels. No c/o pain. Redness noted on Bilat inner ankles, silicone boarder applied for prevention. Wound on left shin cleansed with wound cleanser, ace wrapped. Tolerated well. Call light in reach, plan of care ongoing. Electronically signed by Kermit Ann RN on 2/15/2024 at 11:20 AM   
Patient admitted from ED with AMS, Hip pain and UTI. Patient is A&O x4, but has some confusion. /68, all other VSS. Patient has been oriented to room and has call light and bedside table within reach. Patient is resting in bed at this time with bed alarm activated.   
Patient having difficulty passing stool. Found hard stool at rectum, patient digitally disimpacted and passed soft stool. Tolerated well. States \"that feels much better.\"  
Patient is resting in bed. /70, all other VSS.  Patient has call light and bedside table within reach. Alarm is activated.   
Patient up to chair. A+O x4. No c/o pain. Dressing LLE clean dry and intact, Bandages noted on Right large toe and right 3rd toe. Cleansed with NS, new bandage applied. Heels elevated with pillow support. Call light and side table in reach. Plan of care ongoing.   
Physical Therapy  Facility/Department: [address] MED SURG  Physical Therapy Treatment Note    Name: Ambar Larson  : 1/10/1928  MRN: [id number]  Date of Service: 2024    Discharge Recommendations:  Therapy recommended at discharge, Continue to assess pending progress (3-5x/wk)     Ambar Larson scored a 17/24 on the AM-PAC short mobility form. Current research shows that an AM-PAC score of 17 or less is typically not associated with a discharge to the patient's home setting. Based on the patient's AM-PAC score and their current functional mobility deficits, it is recommended that the patient have 3-5 sessions per week of Physical Therapy at d/c to increase the patient's independence.  Please see assessment section for further patient specific details.    If patient discharges prior to next session this note will serve as a discharge summary.  Please see below for the latest assessment towards goals.          Patient Diagnosis(es): The primary encounter diagnosis was Urinary tract infection without hematuria, site unspecified. Diagnoses of Inability to walk and Left hip pain were also pertinent to this visit.  Past Medical History:  has no past medical history on file.  Past Surgical History:  has no past surgical history on file.    Assessment   Body Structures, Functions, Activity Limitations Requiring Skilled Therapeutic Intervention: Decreased functional mobility ;Decreased ADL status;Decreased strength;Decreased safe awareness;Decreased balance;Decreased endurance  Assessment: Ambar Larson is a 89 y.o. female with a PMH of hypothyroidism who presents to the ED on 24 with complaints of left hip pain and confusion. Pt diagnosed with UTI. Prior to admission, pt living alone in condo setting, independent with ADLs and ambulation with rollator. Pt currently functioning below baseline. This date, patient presents with fluctuating cognition (A&O x 4) then has moments of being confused thinking that she is at home 
Physical Therapy  Facility/Department: [address] MED SURG  Physical Therapy Initial Assessment    Name: Ambar Larson  : 1/10/1928  MRN: [id number]  Date of Service: 2024    Discharge Recommendations:  Therapy recommended at discharge, Continue to assess pending progress          Patient Diagnosis(es): The primary encounter diagnosis was Urinary tract infection without hematuria, site unspecified. Diagnoses of Inability to walk and Left hip pain were also pertinent to this visit.  Past Medical History:  has no past medical history on file.  Past Surgical History:  has no past surgical history on file.    Assessment   Body Structures, Functions, Activity Limitations Requiring Skilled Therapeutic Intervention: Decreased functional mobility ;Decreased ADL status;Decreased strength;Decreased safe awareness;Decreased balance;Decreased endurance  Assessment: Ambar Larson is a 89 y.o. female with a PMH of hypothyroidism who presents to the ED on 24with complaints of left hip pain and confusion. Pt diagnosed with UTI. Prior to admission, pt living alone in condo setting, independent with ADLs and ambulation with rollator. Pt currently functioning below baseline. Anticipate return home with initial 24hr supv and level 3 HHPT. She will require continued skilled therapy if initial 24hr supv is unavailable.  Treatment Diagnosis: impaired mobility  Therapy Prognosis: Fair  Decision Making: Medium Complexity  History: see above  Exam: see below  Clinical Presentation: evolving  Requires PT Follow-Up: Yes  Activity Tolerance  Activity Tolerance: Patient tolerated treatment well     Plan   Physical Therapy Plan  General Plan: 3-5 times per week  Current Treatment Recommendations: Strengthening, Balance training, Functional mobility training, Transfer training, Gait training, Endurance training, Neuromuscular re-education, Cognitive reorientation, Patient/Caregiver education & training, Safety education & training, Equipment 
Physical Therapy  Facility/Department: [address] MED SURG  Physical Therapy Treatment Note    Name: Ambar Larson  : 1/10/1928  MRN: [id number]  Date of Service: 2024    Discharge Recommendations:  Therapy recommended at discharge, Continue to assess pending progress    Ambar Larsno scored a  on the AM-PAC short mobility form. Current research shows that an AM-PAC score of 17 or less is typically not associated with a discharge to the patient's home setting. Based on the patient's AM-PAC score and their current functional mobility deficits, it is recommended that the patient have 3-5 sessions per week of Physical Therapy at d/c to increase the patient's independence.  Please see assessment section for further patient specific details.    If patient discharges prior to next session this note will serve as a discharge summary.  Please see below for the latest assessment towards goals.        Patient Diagnosis(es): The primary encounter diagnosis was Urinary tract infection without hematuria, site unspecified. Diagnoses of Inability to walk and Left hip pain were also pertinent to this visit.  Past Medical History:  has no past medical history on file.  Past Surgical History:  has no past surgical history on file.    Assessment   Body Structures, Functions, Activity Limitations Requiring Skilled Therapeutic Intervention: Decreased functional mobility ;Decreased ADL status;Decreased strength;Decreased safe awareness;Decreased balance;Decreased endurance  Assessment: Ambar Larson is a 89 y.o. female with a PMH of hypothyroidism who presents to the ED on 24with complaints of left hip pain and confusion. Pt diagnosed with UTI. Prior to admission, pt living alone in condo setting, independent with ADLs and ambulation with rollator. Pt currently functioning well below baseline, unable to stand to a RW. Recommend continued skilled therapy 3-5x/week.  Treatment Diagnosis: impaired mobility  Therapy Prognosis: 
Pt discharged to [facility]. This RN gave report to medical transport staff. All questions answered.IV removed w/o complication. Dressing applied.Pt transported with all of her belongings via stretcher.This RN called and notified pt contact, Dania that pt had discharged and also to verify that pt came with one shoe.  
while inpatient       Goals:     Goals: Meet at least 75% of estimated needs       Nutrition Monitoring and Evaluation:   Behavioral-Environmental Outcomes: None Identified  Food/Nutrient Intake Outcomes: Food and Nutrient Intake, Supplement Intake  Physical Signs/Symptoms Outcomes: Biochemical Data, GI Status    Discharge Planning:    Too soon to determine     Aiyana Da Silva RD  Contact: [phone]    
(rollator)  Transfer Assistance: Independent  Active : No  Occupation: Retired  Type of Occupation: [employer]       Objective   Safety Devices  Type of Devices: All fall risk precautions in place;Call light within reach;Gait belt;Patient at risk for falls;Nurse notified;Left in bed;Bed alarm in place     Toilet Transfers  Toilet - Technique:  (via earline pop)  Equipment Used: Standard toilet  Toilet Transfer: Moderate assistance;2 Person assistance     ADL  LE Dressing: Dependent/Total  LE Dressing Skilled Clinical Factors: Dep. to change briefs from earline pop  Toileting: Dependent/Total  Toileting Skilled Clinical Factors: Pt voided urine and had BM from the commode; she required total A for hygiene and briefs mgmt from stedy. Pt appears to have hard stool she is unable to pass at this time - OT attempted to disimpact however pt becoming less responsive the longer she stood so was returned to bed for safety and RN notified  Functional Mobility: Dependent/Total  Functional Mobility Skilled Clinical Factors: Pt stood to RW but was unable to ambulate 2/2 continued L hip pain. Earline pop LIFT used for safe mobility from bed<>bathroom  Skin Care: Bath wipes     Activity Tolerance  Activity Tolerance: Treatment limited secondary to decreased cognition;Patient limited by fatigue  Bed mobility  Supine to Sit: Maximum assistance  Sit to Supine: Maximum assistance;2 Person assistance (2/2 decreased responsiveness after toileting)  Transfers  Sit to stand: Moderate assistance;2 Person assistance  Stand to sit: Moderate assistance;2 Person assistance  Transfer Comments: RW, then earline pop LIFT  Vision  Vision: Impaired  Vision Exceptions: Wears glasses at all times  Hearing  Hearing: Exceptions to WFL  Hearing Exceptions: Hard of hearing/hearing concerns  Cognition  Overall Cognitive Status: Exceptions  Arousal/Alertness: Appropriate responses to stimuli  Following Commands: Follows one step commands with

## 2024-02-19 NOTE — PLAN OF CARE
Problem: Discharge Planning  Goal: Discharge to home or other facility with appropriate resources  2/14/2024 1014 by Kermit Ann RN  Outcome: Progressing  2/14/2024 0227 by Izabel Khanna RN  Outcome: Progressing  Flowsheets (Taken 2/14/2024 0006)  Discharge to home or other facility with appropriate resources:   Identify barriers to discharge with patient and caregiver   Identify discharge learning needs (meds, wound care, etc)   Refer to discharge planning if patient needs post-hospital services based on physician order or complex needs related to functional status, cognitive ability or social support system   Arrange for needed discharge resources and transportation as appropriate     Problem: Pain  Goal: Verbalizes/displays adequate comfort level or baseline comfort level  2/14/2024 1014 by Kermit Ann RN  Outcome: Progressing  2/14/2024 0227 by Izabel Khanna RN  Outcome: Progressing     Problem: Safety - Adult  Goal: Free from fall injury  2/14/2024 1014 by Kermit Ann RN  Outcome: Progressing  2/14/2024 0227 by Izabel Khanna RN  Outcome: Progressing     Problem: ABCDS Injury Assessment  Goal: Absence of physical injury  2/14/2024 1014 by Kermit Ann RN  Outcome: Progressing  2/14/2024 0227 by Izabel Khanna RN  Outcome: Progressing     Problem: Skin/Tissue Integrity  Goal: Absence of new skin breakdown  Description: 1.  Monitor for areas of redness and/or skin breakdown  2.  Assess vascular access sites hourly  3.  Every 4-6 hours minimum:  Change oxygen saturation probe site  4.  Every 4-6 hours:  If on nasal continuous positive airway pressure, respiratory therapy assess nares and determine need for appliance change or resting period.  2/14/2024 1014 by Kermit Ann RN  Outcome: Progressing  2/14/2024 0227 by Izabel Khanna RN  Outcome: Progressing     Problem: Respiratory - Adult  Goal: Achieves optimal ventilation and oxygenation  2/14/2024 1014 by Kermit Ann RN  Outcome: 
  Problem: Discharge Planning  Goal: Discharge to home or other facility with appropriate resources  2/16/2024 2320 by Eddie Johnson RN  Outcome: Progressing  2/16/2024 1035 by Katrin Mendez RN  Outcome: Progressing     Problem: Pain  Goal: Verbalizes/displays adequate comfort level or baseline comfort level  2/16/2024 2320 by Eddie Johnson RN  Outcome: Progressing  2/16/2024 1035 by Katrin Mendez RN  Outcome: Progressing     Problem: Safety - Adult  Goal: Free from fall injury  2/16/2024 2320 by Eddie Johnson RN  Outcome: Progressing  2/16/2024 1035 by Katrin Mendez RN  Outcome: Progressing     Problem: ABCDS Injury Assessment  Goal: Absence of physical injury  2/16/2024 2320 by Eddie Johnson RN  Outcome: Progressing  2/16/2024 1035 by Katrin Mendez RN  Outcome: Progressing     Problem: Skin/Tissue Integrity  Goal: Absence of new skin breakdown  Description: 1.  Monitor for areas of redness and/or skin breakdown  2.  Assess vascular access sites hourly  3.  Every 4-6 hours minimum:  Change oxygen saturation probe site  4.  Every 4-6 hours:  If on nasal continuous positive airway pressure, respiratory therapy assess nares and determine need for appliance change or resting period.  2/16/2024 2320 by Eddie Johnson RN  Outcome: Progressing  2/16/2024 1035 by Katrin Mendez RN  Outcome: Progressing     Problem: Respiratory - Adult  Goal: Achieves optimal ventilation and oxygenation  2/16/2024 2320 by Eddie Johnson RN  Outcome: Progressing  2/16/2024 1035 by Katrin Mendez RN  Outcome: Progressing     Problem: Skin/Tissue Integrity - Adult  Goal: Skin integrity remains intact  2/16/2024 2320 by Eddie Johnson RN  Outcome: Progressing  2/16/2024 1035 by Katrin Mendez RN  Outcome: Progressing  Flowsheets (Taken 2/15/2024 2217 by Eddie Johnson RN)  Skin Integrity Remains Intact:   Monitor for areas of redness and/or skin breakdown   Assess vascular access sites hourly  Goal: 
  Problem: Discharge Planning  Goal: Discharge to home or other facility with appropriate resources  2/19/2024 0312 by Yaneth Little RN  Outcome: Progressing  2/18/2024 1855 by Rama Gregory RN  Outcome: Progressing     Problem: Pain  Goal: Verbalizes/displays adequate comfort level or baseline comfort level  2/19/2024 0312 by Yaneth Little RN  Outcome: Progressing  2/18/2024 1855 by Rama Gregory RN  Outcome: Progressing     Problem: Safety - Adult  Goal: Free from fall injury  2/19/2024 0312 by Yaneth Little RN  Outcome: Progressing  2/18/2024 1855 by Rama Gregory RN  Outcome: Progressing     Problem: ABCDS Injury Assessment  Goal: Absence of physical injury  2/19/2024 0312 by Yaneth Little RN  Outcome: Progressing  2/18/2024 1855 by Rama Gregory RN  Outcome: Progressing     Problem: Skin/Tissue Integrity  Goal: Absence of new skin breakdown  Description: 1.  Monitor for areas of redness and/or skin breakdown  2.  Assess vascular access sites hourly  3.  Every 4-6 hours minimum:  Change oxygen saturation probe site  4.  Every 4-6 hours:  If on nasal continuous positive airway pressure, respiratory therapy assess nares and determine need for appliance change or resting period.  2/19/2024 0312 by Yaneth Little RN  Outcome: Progressing  2/18/2024 1855 by Rama Gregory RN  Outcome: Progressing     Problem: Respiratory - Adult  Goal: Achieves optimal ventilation and oxygenation  2/19/2024 0312 by Yaneth Little RN  Outcome: Progressing  2/18/2024 1855 by Rama Gregory RN  Outcome: Progressing     Problem: Skin/Tissue Integrity - Adult  Goal: Skin integrity remains intact  2/19/2024 0312 by Yaneth Little RN  Outcome: Progressing  Flowsheets (Taken 2/19/2024 0310)  Skin Integrity Remains Intact:   Monitor for areas of redness and/or skin breakdown   Assess vascular access sites hourly  2/18/2024 1855 by Rama Gregory RN  Outcome: Progressing  Goal: 
  Problem: Discharge Planning  Goal: Discharge to home or other facility with appropriate resources  2/19/2024 1339 by Sammi Taylor RN  Outcome: Adequate for Discharge  Flowsheets (Taken 2/19/2024 0830)  Discharge to home or other facility with appropriate resources: Identify barriers to discharge with patient and caregiver  2/19/2024 0312 by Yaneth Little RN  Outcome: Progressing     Problem: Pain  Goal: Verbalizes/displays adequate comfort level or baseline comfort level  2/19/2024 1339 by Sammi Taylor RN  Outcome: Adequate for Discharge  2/19/2024 0312 by Yaneth Little RN  Outcome: Progressing     Problem: Safety - Adult  Goal: Free from fall injury  2/19/2024 1339 by Sammi Taylor RN  Outcome: Adequate for Discharge  2/19/2024 0312 by Yaneth Little RN  Outcome: Progressing     Problem: ABCDS Injury Assessment  Goal: Absence of physical injury  2/19/2024 1339 by Sammi Taylor RN  Outcome: Adequate for Discharge  2/19/2024 0312 by Yaneth Little RN  Outcome: Progressing     Problem: Skin/Tissue Integrity  Goal: Absence of new skin breakdown  Description: 1.  Monitor for areas of redness and/or skin breakdown  2.  Assess vascular access sites hourly  3.  Every 4-6 hours minimum:  Change oxygen saturation probe site  4.  Every 4-6 hours:  If on nasal continuous positive airway pressure, respiratory therapy assess nares and determine need for appliance change or resting period.  2/19/2024 1339 by Sammi Taylor RN  Outcome: Adequate for Discharge  2/19/2024 0312 by Yaneth Little RN  Outcome: Progressing     Problem: Respiratory - Adult  Goal: Achieves optimal ventilation and oxygenation  2/19/2024 1339 by Sammi Taylor RN  Outcome: Adequate for Discharge  Flowsheets (Taken 2/19/2024 0830)  Achieves optimal ventilation and oxygenation: Assess for changes in respiratory status  2/19/2024 0312 by Yaneth Little RN  Outcome: Progressing   
  Problem: Discharge Planning  Goal: Discharge to home or other facility with appropriate resources  2/19/2024 1521 by Sammi Taylor RN  Outcome: Adequate for Discharge  2/19/2024 1339 by Sammi Taylor RN  Outcome: Adequate for Discharge  Flowsheets (Taken 2/19/2024 0830)  Discharge to home or other facility with appropriate resources: Identify barriers to discharge with patient and caregiver  2/19/2024 0312 by Yaneth Little RN  Outcome: Progressing     Problem: Pain  Goal: Verbalizes/displays adequate comfort level or baseline comfort level  2/19/2024 1521 by Sammi Taylor RN  Outcome: Adequate for Discharge  2/19/2024 1339 by Sammi Taylor RN  Outcome: Adequate for Discharge  2/19/2024 0312 by Yaneth Little RN  Outcome: Progressing     Problem: Safety - Adult  Goal: Free from fall injury  2/19/2024 1521 by Sammi Taylor RN  Outcome: Adequate for Discharge  2/19/2024 1339 by Sammi Taylor RN  Outcome: Adequate for Discharge  2/19/2024 0312 by Yaneth Little RN  Outcome: Progressing     Problem: ABCDS Injury Assessment  Goal: Absence of physical injury  2/19/2024 1521 by Sammi Taylor RN  Outcome: Adequate for Discharge  2/19/2024 1339 by Sammi Taylor RN  Outcome: Adequate for Discharge  2/19/2024 0312 by Yaneth Little RN  Outcome: Progressing     Problem: Skin/Tissue Integrity  Goal: Absence of new skin breakdown  Description: 1.  Monitor for areas of redness and/or skin breakdown  2.  Assess vascular access sites hourly  3.  Every 4-6 hours minimum:  Change oxygen saturation probe site  4.  Every 4-6 hours:  If on nasal continuous positive airway pressure, respiratory therapy assess nares and determine need for appliance change or resting period.  2/19/2024 1521 by Sammi Taylor RN  Outcome: Adequate for Discharge  2/19/2024 1339 by Sammi Taylor RN  Outcome: Adequate for Discharge  2/19/2024 0312 by Yaneth Little 
  Problem: Discharge Planning  Goal: Discharge to home or other facility with appropriate resources  Outcome: Progressing     Problem: Pain  Goal: Verbalizes/displays adequate comfort level or baseline comfort level  Outcome: Progressing     Problem: Safety - Adult  Goal: Free from fall injury  Outcome: Progressing     Problem: ABCDS Injury Assessment  Goal: Absence of physical injury  Outcome: Progressing     Problem: Skin/Tissue Integrity  Goal: Absence of new skin breakdown  Description: 1.  Monitor for areas of redness and/or skin breakdown  2.  Assess vascular access sites hourly  3.  Every 4-6 hours minimum:  Change oxygen saturation probe site  4.  Every 4-6 hours:  If on nasal continuous positive airway pressure, respiratory therapy assess nares and determine need for appliance change or resting period.  Outcome: Progressing     Problem: Respiratory - Adult  Goal: Achieves optimal ventilation and oxygenation  Outcome: Progressing     Problem: Skin/Tissue Integrity - Adult  Goal: Skin integrity remains intact  Outcome: Progressing  Goal: Incisions, wounds, or drain sites healing without S/S of infection  Outcome: Progressing  Goal: Oral mucous membranes remain intact  Outcome: Progressing     Problem: Musculoskeletal - Adult  Goal: Return mobility to safest level of function  Outcome: Progressing  Goal: Maintain proper alignment of affected body part  Outcome: Progressing  Goal: Return ADL status to a safe level of function  Outcome: Progressing     Problem: Genitourinary - Adult  Goal: Absence of urinary retention  Outcome: Progressing  Goal: Urinary catheter remains patent  Outcome: Progressing     Problem: Infection - Adult  Goal: Absence of infection at discharge  Outcome: Progressing  Goal: Absence of infection during hospitalization  Outcome: Progressing  Goal: Absence of fever/infection during anticipated neutropenic period  Outcome: Progressing     Problem: Metabolic/Fluid and Electrolytes - 
  Problem: Discharge Planning  Goal: Discharge to home or other facility with appropriate resources  Outcome: Progressing     Problem: Pain  Goal: Verbalizes/displays adequate comfort level or baseline comfort level  Outcome: Progressing     Problem: Safety - Adult  Goal: Free from fall injury  Outcome: Progressing     Problem: ABCDS Injury Assessment  Goal: Absence of physical injury  Outcome: Progressing     Problem: Skin/Tissue Integrity  Goal: Absence of new skin breakdown  Description: 1.  Monitor for areas of redness and/or skin breakdown  2.  Assess vascular access sites hourly  3.  Every 4-6 hours minimum:  Change oxygen saturation probe site  4.  Every 4-6 hours:  If on nasal continuous positive airway pressure, respiratory therapy assess nares and determine need for appliance change or resting period.  Outcome: Progressing     Problem: Respiratory - Adult  Goal: Achieves optimal ventilation and oxygenation  Outcome: Progressing     Problem: Skin/Tissue Integrity - Adult  Goal: Skin integrity remains intact  Outcome: Progressing  Goal: Incisions, wounds, or drain sites healing without S/S of infection  Outcome: Progressing  Goal: Oral mucous membranes remain intact  Outcome: Progressing     Problem: Musculoskeletal - Adult  Goal: Return mobility to safest level of function  Outcome: Progressing  Goal: Maintain proper alignment of affected body part  Outcome: Progressing  Goal: Return ADL status to a safe level of function  Outcome: Progressing     Problem: Genitourinary - Adult  Goal: Absence of urinary retention  Outcome: Progressing  Goal: Urinary catheter remains patent  Outcome: Progressing     Problem: Infection - Adult  Goal: Absence of infection at discharge  Outcome: Progressing  Goal: Absence of infection during hospitalization  Outcome: Progressing  Goal: Absence of fever/infection during anticipated neutropenic period  Outcome: Progressing     Problem: Metabolic/Fluid and Electrolytes - 
2/16/2024 2321 by Eddie Johnson RN  Skin Integrity Remains Intact:   Monitor for areas of redness and/or skin breakdown   Assess vascular access sites hourly  2/16/2024 2320 by Eddie Johnson RN  Outcome: Progressing  Goal: Incisions, wounds, or drain sites healing without S/S of infection  2/17/2024 1304 by Rama Gregory RN  Outcome: Progressing  2/16/2024 2320 by Eddie Johnson RN  Outcome: Progressing  Goal: Oral mucous membranes remain intact  2/17/2024 1304 by Rama Gregory RN  Outcome: Progressing  2/16/2024 2320 by Eddie Johnsno RN  Outcome: Progressing     Problem: Musculoskeletal - Adult  Goal: Return mobility to safest level of function  2/17/2024 1304 by Rama Gregory RN  Outcome: Progressing  2/16/2024 2320 by Eddie Johnson RN  Outcome: Progressing  Goal: Maintain proper alignment of affected body part  2/17/2024 1304 by Rama Gregory RN  Outcome: Progressing  2/16/2024 2320 by Eddie Johnson RN  Outcome: Progressing  Goal: Return ADL status to a safe level of function  2/17/2024 1304 by Rama Gregory RN  Outcome: Progressing  2/16/2024 2320 by Eddie Johnson RN  Outcome: Progressing     Problem: Genitourinary - Adult  Goal: Absence of urinary retention  2/17/2024 1304 by Rama Gregory RN  Outcome: Progressing  2/16/2024 2320 by Eddie Johnson RN  Outcome: Progressing  Goal: Urinary catheter remains patent  2/17/2024 1304 by Rama Gregory RN  Outcome: Progressing  2/16/2024 2320 by Eddie Johnson RN  Outcome: Progressing     Problem: Infection - Adult  Goal: Absence of infection at discharge  2/17/2024 1304 by Rama Gregory RN  Outcome: Progressing  2/16/2024 2320 by Eddie Johnson RN  Outcome: Progressing  Goal: Absence of infection during hospitalization  2/17/2024 1304 by Rama Gregory RN  Outcome: Progressing  2/16/2024 2320 by Aliefeh, Eddie, RN  Outcome: Progressing  Goal: Absence of fever/infection during anticipated neutropenic 
Adult  Goal: Electrolytes maintained within normal limits  Outcome: Progressing  Goal: Hemodynamic stability and optimal renal function maintained  Outcome: Progressing  Goal: Glucose maintained within prescribed range  Outcome: Progressing     Problem: Nutrition Deficit:  Goal: Optimize nutritional status  Outcome: Progressing     
Progressing     Problem: Genitourinary - Adult  Goal: Absence of urinary retention  Outcome: Progressing  Goal: Urinary catheter remains patent  Outcome: Progressing     Problem: Infection - Adult  Goal: Absence of infection at discharge  Outcome: Progressing  Goal: Absence of infection during hospitalization  Outcome: Progressing  Goal: Absence of fever/infection during anticipated neutropenic period  Outcome: Progressing     Problem: Metabolic/Fluid and Electrolytes - Adult  Goal: Electrolytes maintained within normal limits  Outcome: Progressing  Goal: Hemodynamic stability and optimal renal function maintained  Outcome: Progressing  Goal: Glucose maintained within prescribed range  Outcome: Progressing     
RN  Outcome: Progressing  Goal: Oral mucous membranes remain intact  2/15/2024 2216 by Eddie Johnson RN  Outcome: Progressing  2/15/2024 1701 by Kermit Ann RN  Outcome: Progressing     Problem: Musculoskeletal - Adult  Goal: Return mobility to safest level of function  2/15/2024 2216 by Eddie Johnson RN  Outcome: Progressing  2/15/2024 1701 by Kermit Ann RN  Outcome: Progressing  Goal: Maintain proper alignment of affected body part  2/15/2024 2216 by Eddie Johnson RN  Outcome: Progressing  2/15/2024 1701 by Kermit Ann RN  Outcome: Progressing  Goal: Return ADL status to a safe level of function  2/15/2024 2216 by Eddie Johnson RN  Outcome: Progressing  2/15/2024 1701 by Kermit Ann RN  Outcome: Progressing     Problem: Genitourinary - Adult  Goal: Absence of urinary retention  2/15/2024 2216 by Eddie Johnson RN  Outcome: Progressing  2/15/2024 1701 by Kermit Ann RN  Outcome: Progressing  Goal: Urinary catheter remains patent  2/15/2024 2216 by Eddie Johnson RN  Outcome: Progressing  2/15/2024 1701 by Kermit Ann RN  Outcome: Progressing     Problem: Infection - Adult  Goal: Absence of infection at discharge  2/15/2024 2216 by Eddie Johnson RN  Outcome: Progressing  2/15/2024 1701 by Kermit Ann RN  Outcome: Progressing  Goal: Absence of infection during hospitalization  2/15/2024 2216 by Eddie Johnson RN  Outcome: Progressing  2/15/2024 1701 by Kermit Ann RN  Outcome: Progressing  Goal: Absence of fever/infection during anticipated neutropenic period  2/15/2024 2216 by Eddie Johnson RN  Outcome: Progressing  2/15/2024 1701 by Kermit Ann RN  Outcome: Progressing     Problem: Metabolic/Fluid and Electrolytes - Adult  Goal: Electrolytes maintained within normal limits  2/15/2024 2216 by Eddie Johnson RN  Outcome: Progressing  2/15/2024 1701 by Kermit Ann RN  Outcome: Progressing  Goal: Hemodynamic stability and 
limits  2/16/2024 1035 by Katrin Mendez RN  Outcome: Progressing  2/15/2024 2216 by Eddie Johnson RN  Outcome: Progressing  Goal: Hemodynamic stability and optimal renal function maintained  2/16/2024 1035 by Katrin Mendez RN  Outcome: Progressing  2/15/2024 2216 by Eddei Jhonson RN  Outcome: Progressing  Goal: Glucose maintained within prescribed range  2/16/2024 1035 by Katrin Mendez RN  Outcome: Progressing  2/15/2024 2216 by Eddie Johnson RN  Outcome: Progressing     Problem: Nutrition Deficit:  Goal: Optimize nutritional status  2/16/2024 1035 by Katrin Mendez RN  Outcome: Progressing  2/15/2024 2216 by Eddie Johnson RN  Outcome: Progressing

## 2024-02-19 NOTE — CARE COORDINATION
Late Entry. Met with the patient and niece, Dania Hu to discuss discharge planning. Per Dania the patient has a history with [facility] and would like for her to return there for therapy. Referral faxed to [facility] and voicemail left for [facility] in admissions.     Received call back from [facility] stating they can accept the patient and they will have a bed available on Monday, 2/19/24.     Melania REID RN  Case Management  [phone]    Electronically signed by Melania Wong RN on 2/19/2024 at 11:10 AM

## 2024-10-12 NOTE — PROGRESS NOTES
A (CATHETER THRMB [id number]) catheter was inserted. Pharmacy Medication Reconciliation Note     List of medications patient is currently taking is complete. Patient's status of their home medications prior to arrival to the emergency room is known. Source of information:   1.  Transfer list of medications from .C.   2. List of medications in [facility], [facility], [place] 9/14/2022  3:20 PM